# Patient Record
Sex: FEMALE | Race: WHITE
[De-identification: names, ages, dates, MRNs, and addresses within clinical notes are randomized per-mention and may not be internally consistent; named-entity substitution may affect disease eponyms.]

---

## 2017-03-02 ENCOUNTER — HOSPITAL ENCOUNTER (OUTPATIENT)
Dept: HOSPITAL 80 - FSGY | Age: 62
Discharge: HOME | End: 2017-03-02
Attending: SPECIALIST
Payer: COMMERCIAL

## 2017-03-02 DIAGNOSIS — G56.02: Primary | ICD-10-CM

## 2017-03-02 DIAGNOSIS — K21.9: ICD-10-CM

## 2017-03-02 PROCEDURE — 01N50ZZ RELEASE MEDIAN NERVE, OPEN APPROACH: ICD-10-PCS | Performed by: SPECIALIST

## 2017-03-02 NOTE — GOP
DATE OF OPERATION:  03/02/2017



SURGEON:  Juan Lombardi MD



PREOPERATIVE DIAGNOSIS:  Left carpal tunnel syndrome.



POSTOPERATIVE DIAGNOSIS:  Left carpal tunnel syndrome.



PROPOSED OPERATION:  Open left carpal tunnel release. 



FINDINGS:  



INDICATIONS:  Very significant symptoms of numbness, tingling in the median 
distribution left hand.  Some weakness and decreased coordination.  EMG and 
nerve conduction study showed severe carpal tunnel syndrome on the left side.  
It was felt that surgical release at this point was a good option for her.



DESCRIPTION OF PROCEDURE:  Under local infiltration block with 0.5% plain 
Marcaine and with monitored anesthesia care and sedation by anesthesiology, the 
patient's left arm was prepped and draped in the usual fashion.  Arm tourniquet 
applied at 250 mmHg.  Longitudinal incision was made from the mid palm to the 
heel of the palm.  Skin and subcutaneous tissue was reflected and one sensory 
branch of the palmar cutaneous nerve was identified and protected.  Palmar 
fascia and transverse carpal ligament were released longitudinally and distal 
forearm fascia was released by passing a Metzenbaum scissors under the skin at 
the distal forearm after first bringing up overlying and underlying structures.
  The median nerve was erythematous and hour glassed under the transverse 
carpal ligament.  The ligament was notably thickened.  Once the release had 
been performed, distal branches were inspected.  There was no entrapment there, 
and proximally in the forearm there was no evidence of pathology.  Wound was 
irrigated with 0.5% plain Marcaine.  Skin closed with horizontal mattress 
sutures of 5-0 Prolene.  A bulky soft pressure dressing was applied followed by 
power base fiberglass splint, held in place with an Ace bandage.  She tolerated 
the procedure well.  There were no complications.  Tourniquet deflation 
resulted in immediate pinking of the digits.  She was brought to the recovery 
area where detailed postoperative instructions were given prior to discharge.  
A prescription for Percocet and Keflex was provided.  She takes Percocet on a 
regular basis and this was some additional Percocet for increased pain from 
postoperative symptoms.  Followup arrangements in the office for about a week 
postop for dressing and suture removal and remobilization exercises with a 
Velcro strap splint to be worn at night for a month and as need be during the 
day.





Job #:  [id number]

MTDD

## 2017-05-25 ENCOUNTER — HOSPITAL ENCOUNTER (OUTPATIENT)
Dept: HOSPITAL 80 - FIMAGING | Age: 62
End: 2017-05-25
Attending: INTERNAL MEDICINE
Payer: COMMERCIAL

## 2017-05-25 DIAGNOSIS — D25.1: Primary | ICD-10-CM

## 2017-05-25 DIAGNOSIS — D25.2: ICD-10-CM

## 2017-05-25 DIAGNOSIS — N95.0: ICD-10-CM

## 2017-06-12 ENCOUNTER — HOSPITAL ENCOUNTER (OUTPATIENT)
Dept: HOSPITAL 80 - FIMAGING | Age: 62
End: 2017-06-12
Attending: NURSE PRACTITIONER
Payer: COMMERCIAL

## 2017-06-12 DIAGNOSIS — M54.2: ICD-10-CM

## 2017-06-12 DIAGNOSIS — Z98.1: Primary | ICD-10-CM

## 2017-06-12 DIAGNOSIS — Z09: ICD-10-CM

## 2017-06-13 ENCOUNTER — HOSPITAL ENCOUNTER (OUTPATIENT)
Dept: HOSPITAL 80 - FLAB | Age: 62
End: 2017-06-13
Attending: NURSE PRACTITIONER
Payer: COMMERCIAL

## 2017-06-13 DIAGNOSIS — M51.34: Primary | ICD-10-CM

## 2017-06-13 DIAGNOSIS — Z98.1: ICD-10-CM

## 2017-07-31 ENCOUNTER — HOSPITAL ENCOUNTER (OUTPATIENT)
Dept: HOSPITAL 80 - FIMAGING | Age: 62
End: 2017-07-31
Attending: INTERNAL MEDICINE
Payer: COMMERCIAL

## 2017-07-31 DIAGNOSIS — Z12.31: Primary | ICD-10-CM

## 2017-07-31 DIAGNOSIS — Z09: ICD-10-CM

## 2017-07-31 PROCEDURE — G0202 SCR MAMMO BI INCL CAD: HCPCS

## 2017-12-12 ENCOUNTER — HOSPITAL ENCOUNTER (OUTPATIENT)
Dept: HOSPITAL 80 - FLAB | Age: 62
End: 2017-12-12
Attending: NURSE PRACTITIONER
Payer: COMMERCIAL

## 2017-12-12 DIAGNOSIS — Z98.1: ICD-10-CM

## 2017-12-12 DIAGNOSIS — M51.34: ICD-10-CM

## 2017-12-12 DIAGNOSIS — Z09: Primary | ICD-10-CM

## 2018-01-16 ENCOUNTER — HOSPITAL ENCOUNTER (OUTPATIENT)
Dept: HOSPITAL 80 - FIMAGING | Age: 63
End: 2018-01-16
Attending: NEUROLOGICAL SURGERY
Payer: COMMERCIAL

## 2018-01-16 DIAGNOSIS — M40.12: ICD-10-CM

## 2018-01-16 DIAGNOSIS — M54.2: Primary | ICD-10-CM

## 2018-01-16 DIAGNOSIS — M43.22: ICD-10-CM

## 2018-02-14 ENCOUNTER — HOSPITAL ENCOUNTER (OUTPATIENT)
Dept: HOSPITAL 80 - FIMAGING | Age: 63
End: 2018-02-14
Attending: PHYSICIAN ASSISTANT
Payer: COMMERCIAL

## 2018-02-14 DIAGNOSIS — M48.02: Primary | ICD-10-CM

## 2018-02-14 DIAGNOSIS — M46.92: ICD-10-CM

## 2018-07-04 ENCOUNTER — HOSPITAL ENCOUNTER (EMERGENCY)
Dept: HOSPITAL 80 - CED | Age: 63
Discharge: HOME | End: 2018-07-04
Payer: COMMERCIAL

## 2018-07-04 VITALS — DIASTOLIC BLOOD PRESSURE: 94 MMHG | SYSTOLIC BLOOD PRESSURE: 175 MMHG

## 2018-07-04 DIAGNOSIS — S46.211A: Primary | ICD-10-CM
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
## 2018-07-04 NOTE — EDPHY
H & P


Time Seen by Provider: 07/04/18 18:07


HPI/ROS: 





This patient was trying out air mattresses 5 days ago and when she pushed up 

off the floor she developed abrupt onset of biceps pain that radiated up to the 

shoulder.  This was sharp in nature moderate intensity and thereafter she had 

mild to moderate pain at the biceps and right shoulder area.  3 days prior to 

arrival for getting that she had injured the arm she lifted a cooler full of 

sodas and felt abrupt worsening in a tearing feeling in the same area-right 

biceps.  Today without any recurrent injury she noted significant swelling, 

ecchymosis and increase in pain to 6 or 7/10 to the right biceps region 

radiating to the right shoulder and into the right trapezium.  At 1:00 p.m. She 

took an oxycodone which improved the pain to 5/10.   Concerned about potential 

DVT or other complication she came in for evaluation.  She arrived here by 

private vehicle with a friend.





ROS:  Neuro:  Patient has baseline paresthesias to the right arm that she 

attributes to her cervical spine disease that is baseline unchanged.  No new 

neuro symptoms knee affected upper extremity.


Musculoskeletal:  No new midline neck pain.


Pulmonary:  No complaints-no shortness of breath


5 point ROS is otherwise negative.





Past Medical/Surgical History: 





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  





GERD





Chronic neck and back pain








Social History: 





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


Smoking Status: Never smoked


Physical Exam: 





Physical Exam


Vital signs are normal.


General:  No acute distress


HEENT:  Atraumatic.  


Eyes:  Pupils equal and react to light.  Extraocular motions are intact.


Lungs:  No respiratory distress.


Cardiac:  Brisk capillary refill is intact throughout.  Pulses are 2+ and 

symmetric in the affected extremity.


Skin:  No rash or pallor.


Extremities:  Atraumatic normal except for right upper extremity


Right upper extremity:  Patient has moderate ecchymosis and focal swelling to 

the mid biceps area.  He has mild weakness to flexion of the right arm and 

increase in pain when she does so.  She also has tenderness and mild spasm to 

the right trapezius muscle.  No focal tenderness at the right shoulder 

limitation range of motion of the shoulder.


Neuro:  Alert and oriented x3 with no sensorimotor deficits in the affected 

upper extremity.





Initial differential diagnosis:  Biceps tendon tear, biceps muscle tear, post 

traumatic hematoma, posttraumatic DVT


Constitutional: 


 Initial Vital Signs











Temperature (C)  37.1 C   07/04/18 17:35


 


Heart Rate  74   07/04/18 17:35


 


Respiratory Rate  18   07/04/18 17:35


 


Blood Pressure  179/88 H  07/04/18 17:35


 


O2 Sat (%)  96   07/04/18 17:35








 











O2 Delivery Mode               Room Air














Allergies/Adverse Reactions: 


 





cefaclor [From Ceclor] Allergy (Intermediate, Verified 07/04/18 17:39)


 Hives


nalbuphine HCl [From Nubain] Allergy (Intermediate, Verified 07/04/18 17:39)


 Other-Enter Comments


promethazine HCl [From Phenergan] Allergy (Intermediate, Verified 07/04/18 17:39

)


 Anxiety








Home Medications: 














 Medication  Instructions  Recorded


 


Fluoxetine HCl [Prozac 40 mg] 40 mg PO DAILY 09/16/15


 


oxyCODONE IR [Oxycodone Ir (*)] 5 mg PO TID PRN 10/29/15


 


Estradiol [Vivelle-Dot 0.025MG (*)] 0.025 mg TD SA@08 11/21/15


 


Aygestin 1 patch 04/29/16


 


Cyanocobalamin 1 unit IM 04/29/16


 


FOLIC ACID 400 mg 02/10/17


 


GABAPENTIN 1 tab TID 02/10/17


 


Pantoprazole Sodium 1 tab DAILY 02/10/17


 


Meloxicam  07/04/18


 


Methocarbamol [Robaxin 750 mg (*)] 750 - 1,500 mg PO QID PRN #30 tab 07/04/18


 


VENLAFAXINE HCL  07/04/18














MDM/Departure





- MDM


Imaging Results: 


 Imaging Impressions





Extremity Venous Study  07/04/18 18:17


Impression:  No evidence of vein thrombosis in the right arm.


 


Results called and discussed with CHACHO SULLIVAN M.D. on 7/4/2018 at 19:48.


 


 











Imaging: Discussed imaging studies w/ On call Radiologist


Medications Given: 


 








Discontinued Medications





Acetaminophen (Tylenol)  1,000 mg PO EDNOW ONE


   Stop: 07/04/18 18:17


   Last Admin: 07/04/18 18:25 Dose:  1,000 mg


Diazepam (Valium)  5 mg PO EDNOW ONE


   Stop: 07/04/18 18:17


   Last Admin: 07/04/18 18:26 Dose:  5 mg





ED Course/Re-evaluation: 


Tylenol, Valium with pain diminish to 3 or 4/10.


Ace wrap





I counseled patient regarding ultrasound negative for DVT and counseled 

regarding biceps tendon/muscle tear.





Discussion:  This patient likely had a partial biceps tear 5 days ago that she 

completed 3 days ago while lifting another object.  Today she developed more 

swelling and pain likely from hematoma.  We ruled out DVT.  She is 

neurovascularly intact without other red flag findings.  I counseled regarding 

her injury in some detail.  We placed an Ace wrap.  Will have her follow up 

with Orthopedics and limit lifting in the meantime.  She will use ice, ibuprofen

, Tylenol and methocarbamol as needed for pain as well as her current script 

that she has for oxycodone for her chronic pain.  She understands the need to 

return emergency department should she develop any significant worsening 

despite the treatment plan.





- Depart


Disposition: Home, Routine, Self-Care


Clinical Impression: 


Tear of right biceps muscle


Qualifiers:


 Encounter type: initial encounter Qualified Code(s): S46.211A - Strain of 

muscle, fascia and tendon of other parts of biceps, right arm, initial encounter





Condition: Good


Instructions:  Tendon Rupture (ED)


Additional Instructions: 


Diagnosis:  Biceps tendon and/or muscle tear right arm





You had an ultrasound today that ruled out deep venous thrombosis.





Plan:  Ice 20 min at a time at least 3 times a day but preferably more until 

the swelling is diminished.





Ace wrap when up and about





No lifting with the right arm





Call the orthopedic physician tomorrow to arrange follow-up appointment for 

early next week.





Continue ibuprofen, methocarbamol and Tylenol for pain control.





Return for any significant worsening despite the treatment plan.


Stand Alone Forms:  Work Excuse


Prescriptions: 


Methocarbamol [Robaxin 750 mg (*)] 750 - 1,500 mg PO QID PRN #30 tab


 PRN Reason: Muscle Spasms


Referrals: 


Verna Henderson MD [Primary Care Provider] - As per Instructions


Vahid You MD [Medical Doctor] - As per Instructions

## 2018-07-17 ENCOUNTER — HOSPITAL ENCOUNTER (OUTPATIENT)
Dept: HOSPITAL 80 - FIMAGING | Age: 63
End: 2018-07-17
Attending: PHYSICIAN ASSISTANT
Payer: COMMERCIAL

## 2018-07-17 DIAGNOSIS — M25.511: ICD-10-CM

## 2018-07-17 DIAGNOSIS — M75.81: ICD-10-CM

## 2018-07-17 DIAGNOSIS — M66.821: Primary | ICD-10-CM

## 2018-09-20 ENCOUNTER — HOSPITAL ENCOUNTER (OUTPATIENT)
Dept: HOSPITAL 80 - CIMAGING | Age: 63
End: 2018-09-20
Attending: INTERNAL MEDICINE
Payer: COMMERCIAL

## 2018-09-20 DIAGNOSIS — Z12.31: Primary | ICD-10-CM

## 2018-11-23 ENCOUNTER — HOSPITAL ENCOUNTER (INPATIENT)
Dept: HOSPITAL 80 - FED | Age: 63
LOS: 7 days | Discharge: NURSING, REHAB OR LONG-TERM CARE | DRG: 454 | End: 2018-11-30
Attending: INTERNAL MEDICINE | Admitting: INTERNAL MEDICINE
Payer: COMMERCIAL

## 2018-11-23 DIAGNOSIS — Z98.1: ICD-10-CM

## 2018-11-23 DIAGNOSIS — M50.123: Primary | ICD-10-CM

## 2018-11-23 DIAGNOSIS — M50.023: ICD-10-CM

## 2018-11-23 DIAGNOSIS — F32.9: ICD-10-CM

## 2018-11-23 DIAGNOSIS — M96.0: ICD-10-CM

## 2018-11-23 DIAGNOSIS — I10: ICD-10-CM

## 2018-11-23 DIAGNOSIS — G62.9: ICD-10-CM

## 2018-11-23 DIAGNOSIS — M50.03: ICD-10-CM

## 2018-11-23 LAB — PLATELET # BLD: 250 10^3/UL (ref 150–400)

## 2018-11-23 PROCEDURE — C1713 ANCHOR/SCREW BN/BN,TIS/BN: HCPCS

## 2018-11-23 RX ADMIN — Medication SCH MG: at 21:37

## 2018-11-23 RX ADMIN — GABAPENTIN SCH MG: 300 CAPSULE ORAL at 20:27

## 2018-11-23 RX ADMIN — MENTHOL PRN PATCH: 1 SPRAY TOPICAL at 21:49

## 2018-11-23 RX ADMIN — METHOCARBAMOL SCH MG: 750 TABLET ORAL at 21:36

## 2018-11-23 RX ADMIN — HYDROMORPHONE HYDROCHLORIDE PRN MG: 4 TABLET ORAL at 20:26

## 2018-11-23 RX ADMIN — HEPARIN SODIUM SCH UNIT: 5000 INJECTION, SOLUTION INTRAVENOUS; SUBCUTANEOUS at 21:37

## 2018-11-23 RX ADMIN — OXYCODONE HYDROCHLORIDE PRN MG: 15 TABLET ORAL at 21:30

## 2018-11-23 RX ADMIN — ESTRADIOL SCH MG: 0.05 PATCH, EXTENDED RELEASE TRANSDERMAL at 21:41

## 2018-11-23 NOTE — PDGENHP
History and Physical


History and Physical: 





CC: radicular neck pain





HISTORY:  This patient comes into the ER today with right arm radicular pain 

including neuropathic pains that are acute over the last 3-4 days and worsening 

significantly today.  She has a long history of spine disease with 2 prior 

lumbar surgeries and effusion in the upper cervical spine.  She has had ongoing 

neck pain and has been following with Dr. Pablo Mariano her surgeon who has told 

her she will likely need future fusion surgery in the lower cervical spine.  

Notably just recently she had a facet joint injection on the left cervical 

spine for some localized pain there.  She also tells me that 2 weeks ago she 

tripped and fell landing on her left ribcage and has some mildly sore ribs 

there.  6 days ago she tripped again on an uneven curb and landed on her 

outstretched hands in front of her, wrenching her neck a bit.  She has had some 

tight muscles in the upper thoracic spine area since then.  And now the onset 

of radicular pain comes from the lower neck area across the upper trapezius and 

lateral shoulder down the back of her arm into her mid hand area.  She has 

burning tingling and itching sensations involved in this pain.  If she turns 

her head to the right and looks down the pain is exacerbated.  She has noticed 

no weakness and no actual numb areas.  There is no fever or fever like 

symptoms.  She has no trouble with bowel or bladder function, is eating well.








ROS:  She recently had a cough that has resolved completely with no dyspnea or 

fevers.  A comprehensive 10 system review revealed no other significant findings








PAST MEDICAL HISTORY:


NonFlow Limiting CAD by angiography in 2012


OA


B12 defic


anxiety disorder


depression


HTN


peripheral neuropathy


Cervical Spine disease





FAMILY MEDICAL HISTORY:


CAD


HTN


Hyperlipid


Throat Cancer


DM1








SOCIAL HISTORY:


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


No tobacco, little Etoh





MEDICATIONS:


The patients list has been reconciled by our clinical pharmacist in the EMR.  I 

have reviewed the list and ordered appropriate medicines.





PHYSICAL EXAMINATION:


Vital Signs: some HTN otherwise stable


Cardiac Monitor:





Examination:


General: alert, oriented, good mentation, relaxed


Skin: warm, dry, good color, no rash


HEENT: normal


Neck: no mass or jvd


Resps: relaxed


Lungs: clear breath sounds


Heart: regular, no murmur


Abdomen: soft, nondistended, nontender, +BS, no mass


Upper Extremities: normal


Lower Extremities: no edema, warm


No Bleeding or bruising


Neurologic: normal speech/language, normal CNs, no focal weakness


IV site: looks normal








LABORATORY DATA:


unremarkable cbc and metabolic panel





RADIOLOGY STUDIES:


I reviewed images of MRI cspine done tonight in ER, shows new severe disc 

disease with C6-7 severe canal stenosis and foramenal stenosis





12 LEAD EKG:





ASSESSMENT:


* Severe acute radicular Cervical pain with new disc disease on MRI consistent 

with the anatomy of her pain


* Multiple prior spine surgeries including an upper cervical fusion


* Currently no loss of sensation or strength


* Hypertension on admission to the ER is likely due to pain and anxiety, it is 

improving spontaneously and will need close follow-up here





It may be that she is having muscle spasm related to her recent falls that is 

contributing to some postural changes of the spine increasing nerve entrapment 

and symptoms.  However she does have significant disc disease.  She is hoping 

to avoid surgery.  Will plan on starting with some oral steroids, muscle 

relaxers, and analgesics along with anti-inflammatories and see how much 

progress he makes.  Could consider an epidural steroid injection.  If she does 

not progress she can always consider a surgery but there is no urgent 

indication for surgery at this moment.  Dr. Walker from the neurosurgery service 

has been notified of the patient's admission








PLANS:


* Inpatient admission as I expect it will be more than 48 hr before we get 

adequate pain control in this patient


* Oral steroids have been started in the ER, will use nonsteroidals, muscle 

relaxers, analgesics as well


* Consider epidural steroid injection of the next couple of days if she does 

not have significant improvement


* Neurosurgery consult is requested by the ER staff


* Continue her other usual medications


* Follow blood pressures closely and consider additional treatment if necessary








I have reviewed the patient's case in detail with Dr. Enrique Almendarez


I have reviewed the patient's past medical records as part of this assessment, 

including prior hospital admission records

## 2018-11-23 NOTE — GCON
DATE OF CONSULTATION:  11/23/2018



Patient was seen and evaluated at approximately 6:30 p.m. in the ER at [facility].



HPI:  The patient is a 63-year-old woman who was a patient of my partner, Dr. Mariano.  In 2014, he di
d a C4 corpectomy, C5-6 ACDF, and C4-C6 anterior spinal fusion.  Since that time, she has done fairly
 well, but has had continued neck pain.  Most recently in January, she had a CT scan, which shows a p
seudoarthrosis at C5-6 with some loosening of the C6 screws.  She also has a bit of spondylolisthesis
 at the adjacent level of C6-7.  She notes that she has had numbness and tingling in the right arm in
 a C8 distribution for about the past 3 months.  She was visiting her family in [place] and had a fall 
earlier this week and now has had severe pain in the neck, trapezius, and radiating down the lateral 
aspect of the right arm to the dorsal forearm and to the 4th and 5th digits.  This burning pain has b
een severe enough that it caused her to come to the emergency department today.  She did not have any
 notable neurologic deficits, although she states that she has been dropping things for the past reinier
ral months with both hands and has had difficulty with her balance, has been tripping over things.  A
 new MRI was done here, which again shows spondylolisthesis at C6-7 with severe adjacent segment dise
ase and severe canal stenosis with bilateral foraminal stenosis.



REVIEW OF SYSTEMS:  A 10-point review of systems is negative other than that described above in the H
PI.



PAST MEDICAL HISTORY:  

1.  Coronary artery disease by angiography in 2012.

2.  Osteoarthritis.

3.  Vitamin B12 deficiency.

4.  Anxiety disorder.

5.  Depression.

6.  Hypertension.

7.  Peripheral neuropathy.

8.  Previous cervical spine fusion.



FAMILY HISTORY:  The family history is positive for:

1.  Coronary artery disease.

2.  Hypertension.

3.  Hyperlipidemia.

4.  Throat cancer.

5.  Diabetes.



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  She denies any tobacco or illicit drug use.  
She uses very little social alcohol.



ALLERGIES:  

1.  Ceclor.

2.  Nalbuphine.

3.  Promethazine.



HOME MEDICATIONS:  

1.  Fluoxetine.

2.  Oxycodone.

3.  Estradiol.

4.  Aygestin.

5.  Vitamin B12.

6.  Gabapentin.

7.  Pantoprazole.

8.  Meloxicam.

9.  Robaxin.

10.  Venlafaxine.

11.  Prednisone.



PHYSICAL EXAM:  VITAL SIGNS:  Currently she is afebrile with normal stable vital signs.  GENERAL:  Ken burnham is awake, alert, and oriented x3.  NEURO:  Cranial nerves 2-12 are grossly normal.  MUSCULOSKELETAL
:  She has 5/5 motor strength of the deltoid, biceps, triceps, wrist flexion and extension,  bila
terally.  In the lower extremities, she has 5/5 strength at the hip flexors, extensors, knee flexors,
 extensors, and plantar and dorsiflexion.  Her sensation is grossly normal to light touch throughout 
the arms and legs.  She has brisk reflexes at the elbows and a positive Turner's in both hands.  In 
the lower extremities, her reflexes at the knees are mute as she has had bilateral knee surgery, but 
she does have sustained clonus on the left foot and about 7 beats of clonus on the right foot.  The B
abinski is rather mute, but her toes may be slightly upgoing.



IMAGING REVIEW:  See HPI.



ASSESSMENT/PLAN:  The patient is a 63-year-old woman with a prior C4 corpectomy, C5-6 ACDF, and C3-C6
 anterior spinal fusion.  She presents now with worsening of her known severe adjacent segment diseas
e at C6-7 with severe canal and bilateral foraminal stenosis at C6-7.  By examination, she is myelopa
thic with severe right arm radiculopathy.  I have reviewed all of her films and by her old CT from Ja
nuary of this year, she has what appears to be a relatively stable pseudoarthrosis at C5-6 with loose
bienvenido of the screws at C6 as well.  The remaining portion of her fusion appears to be quite stable, al
though it does appear there may have been some pistoning of the corpectomy graft into the C5 vertebra
l body.  I discussed with her the options, but I really do not see many other options than surgical c
orrection of this problem.  Unfortunately, this would need to involve a C6-7 anterior cervical diskec
jessica and fusion followed by a posterior spinal fusion from C3-T1.  I explained to her what all the rosales
rgery would entail and it is obviously a larger procedure than she has had previously.  It would be i
mportant to lock her together in the back given that she already has a pseudoarthrosis at C5-6 and he
r likely poor bone quality.  I do not think that this is an emergent operation, but given the signs o
f myelopathy, I would do it sooner rather than later.  I did offer to schedule this for her tomorrow 
morning.  However, she really would prefer to have Dr. Mariano do this if possible as he was her prior
 surgeon.  I will discuss this with Dr. Mariano and see what his availability might be to do her surge
ry for her and if he does not have availability, then I would be more than happy to reschedule her at
 another time.  For now, she is admitted to the Internal Medicine service and they will pursue pain c
ontrol for her.  We will follow along closely and keep the primary service abreast of any surgical de
velopments.  Thanks for the kind consultation.





Job #:  [id number]

## 2018-11-23 NOTE — EDPHY
H & P


Time Seen by Provider: 11/23/18 13:30


HPI/ROS: 





CHIEF COMPLAINT:  Neck pain





HISTORY OF PRESENT ILLNESS:  Patient is a 63-year-old female who presents to 

the emergency department with neck pain radiating down her right arm.  The 

patient states she fell Saturday, landing forward on her knees and hands.  She 

felt a slight jar in her low back with the fall.  She denied any neck pain.  

She did not strike her head or lose consciousness.  She subsequently developed 

right lateral neck pain extending down her right arm.  She describes it as a 

burning sensation.  Patient has previously had a C3 through C6 fusion.  She has 

seen Dr. Mariano for ongoing right arm tingling.  He states that disc space 

below the fusion is having some compression.  The patient has no low back pain.

  No incontinence of urine or stool.





REVIEW OF SYSTEMS:  


10 systems were reveiwed and are negative with the exception of the elements 

mentioned in the history of present illness.


Past Medical/Surgical History: 





Includes back pain, neck pain, hiatal hernia, H pylori





Past surgical history:  Cholecystectomy, orthopedic surgery, cervical fusion, 

thoracic fusion


Smoking Status: Never smoked


Physical Exam: 





Vitals noted


GENERAL:  Well-appearing, in no acute distress, alert.


HEENT:  Eyes normal to inspection, normal pharynx, no signs of dehydration.


NECK:  Normal, supple.  No tenderness to palpation.  No bruit.


RESPIRATORY:  Clear to auscultation bilaterally, no rales, rhonchi or wheezing.


CVS:  Regular rate and rhythm, no rubs, murmurs, or gallops.


ABDOMEN:  Soft, nontender, nondistended, no organomegaly.


BACK:  Normal to inspection, no CVA tenderness.


SKIN:  Normal color, no rash, warm, dry.  No pallor.


EXTREMITIES:  No pedal edema, no calf tenderness, no Homans sign or cords, no 

joint swelling.


NEURO/PSYCH:  Alert and oriented, normal mood and affect, normal motor sensory 

exam.  No obvious cranial nerve deficit.


Constitutional: 


 Initial Vital Signs











Temperature (C)  37.1 C   11/23/18 13:30


 


Heart Rate  101 H  11/23/18 13:30


 


Respiratory Rate  18   11/23/18 13:30


 


Blood Pressure  187/87 H  11/23/18 13:30


 


O2 Sat (%)  95   11/23/18 13:30








 











O2 Delivery Mode               Room Air














Allergies/Adverse Reactions: 


 





cefaclor [From [facility]] Allergy (Intermediate, Verified 07/04/18 17:39)


 Hives


nalbuphine HCl [From Nubain] Allergy (Intermediate, Verified 07/04/18 17:39)


 Other-Enter Comments


promethazine HCl [From Phenergan] Allergy (Intermediate, Verified 07/04/18 17:39

)


 Anxiety








Home Medications: 














 Medication  Instructions  Recorded


 


Fluoxetine HCl [Prozac 40 mg] 40 mg PO DAILY 09/16/15


 


oxyCODONE IR [Oxycodone Ir (*)] 5 mg PO TID PRN 10/29/15


 


Estradiol [Vivelle-Dot 0.025MG (*)] 0.025 mg TD SA@08 11/21/15


 


Aygestin 1 patch 04/29/16


 


Cyanocobalamin 1 unit IM 04/29/16


 


GABAPENTIN 1 tab TID 02/10/17


 


Pantoprazole Sodium 1 tab DAILY 02/10/17


 


Meloxicam  07/04/18


 


Methocarbamol [Robaxin 750 mg (*)] 750 - 1,500 mg PO QID PRN #30 tab 07/04/18


 


VENLAFAXINE HCL  07/04/18


 


oxyCODONE/APAP 5/325 [Percocet 1 - 2 tab PO Q4PRN PRN #11 tab 11/23/18





5/325 (*)]  


 


predniSONE 20 mg PO DAILY 4 Days  tab 11/23/18














Medical Decision Making


ED Course/Re-evaluation: 





In the emergency department I discussed possible etiologies with the patient.  

I answered all her questions.  Patient has ongoing neck pain radiating down her 

right arm.  MRI of her cervical spine was ordered.  Patient was given Toradol 

30 mg IV for pain, fentanyl 25 mcg IV for pain and Solu-Medrol 125 mg IV for 

pain.





1445:  The patient is complaining of ongoing right lateral neck and right arm 

pain.  The patient was given fentanyl 100 mcg IV.





1500:  The patient is signed out to Dr. Carbajal at change of shift.  The 

patient is awaiting MRI results.  


Differential Diagnosis: 





My differential includes but is not limited to disc herniation, cervical strain

, mass, malignancy, hematoma, CVA, dissection





- Data Points


Medications Given: 


 








Discontinued Medications





Fentanyl (Sublimaze)  25 mcg IVP EDNOW ONE


   Stop: 11/23/18 14:10


   Last Admin: 11/23/18 14:26 Dose:  25 mcg


Sodium Chloride (Ns)  1,000 mls @ 3,000 mls/hr IV ONCE ONE


   Stop: 11/23/18 14:28


   Last Admin: 11/23/18 14:14 Dose:  1,000 mls


Ketorolac Tromethamine (Toradol)  30 mg IVP EDNOW ONE


   Stop: 11/23/18 14:11


   Last Admin: 11/23/18 14:20 Dose:  30 mg


Methylprednisolone Sodium Succinate (Solu-Medrol)  125 mg IVP EDNOW ONE


   Stop: 11/23/18 14:11


   Last Admin: 11/23/18 14:26 Dose:  125 mg


Ondansetron HCl (Zofran)  4 mg IVP EDNOW ONE


   Stop: 11/23/18 14:10


   Last Admin: 11/23/18 14:27 Dose:  4 mg








Departure





- Departure


Disposition: Home, Routine, Self-Care


Clinical Impression: 


 Neck pain





Condition: Good


Instructions:  Acute Neck Pain (ED)


Additional Instructions: 


Return with increasing pain, weakness, numbness or any other concerns.  Take 

your entire course of steroid.


Referrals: 


Verna Henderson MD [Primary Care Provider] - 5-7 days, call for appt.


STEPHANIA Mariano MD [Medical Doctor] - 5-7 days, call for appt.


Prescriptions: 


oxyCODONE/APAP 5/325 [Percocet 5/325 (*)] 1 - 2 tab PO Q4PRN PRN #11 tab


 PRN Reason: For Moderate To Severe Pain


predniSONE 20 mg PO DAILY 4 Days  tab

## 2018-11-24 RX ADMIN — OXYCODONE HYDROCHLORIDE PRN MG: 15 TABLET ORAL at 14:37

## 2018-11-24 RX ADMIN — HEPARIN SODIUM SCH UNIT: 5000 INJECTION, SOLUTION INTRAVENOUS; SUBCUTANEOUS at 14:37

## 2018-11-24 RX ADMIN — ZOLPIDEM TARTRATE PRN MG: 5 TABLET ORAL at 00:36

## 2018-11-24 RX ADMIN — VENLAFAXINE HYDROCHLORIDE SCH MG: 150 CAPSULE, EXTENDED RELEASE ORAL at 08:32

## 2018-11-24 RX ADMIN — Medication SCH MG: at 20:39

## 2018-11-24 RX ADMIN — ZOLPIDEM TARTRATE PRN MG: 5 TABLET ORAL at 22:26

## 2018-11-24 RX ADMIN — MENTHOL PRN PATCH: 1 SPRAY TOPICAL at 08:34

## 2018-11-24 RX ADMIN — HEPARIN SODIUM SCH UNIT: 5000 INJECTION, SOLUTION INTRAVENOUS; SUBCUTANEOUS at 22:22

## 2018-11-24 RX ADMIN — POLYETHYLENE GLYCOL 3350 PRN GM: 17 POWDER, FOR SOLUTION ORAL at 23:49

## 2018-11-24 RX ADMIN — METHOCARBAMOL SCH MG: 750 TABLET ORAL at 14:45

## 2018-11-24 RX ADMIN — PANTOPRAZOLE SODIUM SCH MG: 40 TABLET, DELAYED RELEASE ORAL at 08:32

## 2018-11-24 RX ADMIN — GABAPENTIN SCH MG: 300 CAPSULE ORAL at 08:32

## 2018-11-24 RX ADMIN — OXYCODONE HYDROCHLORIDE PRN MG: 15 TABLET ORAL at 08:48

## 2018-11-24 RX ADMIN — Medication SCH UNITS: at 08:32

## 2018-11-24 RX ADMIN — METHOCARBAMOL SCH MG: 750 TABLET ORAL at 22:25

## 2018-11-24 RX ADMIN — OXYCODONE HYDROCHLORIDE PRN MG: 15 TABLET ORAL at 20:38

## 2018-11-24 RX ADMIN — METHOCARBAMOL SCH MG: 750 TABLET ORAL at 08:33

## 2018-11-24 RX ADMIN — HYDROMORPHONE HYDROCHLORIDE PRN MG: 4 TABLET ORAL at 00:36

## 2018-11-24 RX ADMIN — HEPARIN SODIUM SCH UNIT: 5000 INJECTION, SOLUTION INTRAVENOUS; SUBCUTANEOUS at 05:35

## 2018-11-24 RX ADMIN — GABAPENTIN SCH MG: 300 CAPSULE ORAL at 22:25

## 2018-11-24 RX ADMIN — NORETHINDRONE ACETATE SCH MG: 5 TABLET ORAL at 20:36

## 2018-11-24 RX ADMIN — GABAPENTIN SCH MG: 300 CAPSULE ORAL at 14:45

## 2018-11-24 NOTE — PDMN
Medical Necessity


Medical necessity: Pt meets inpt criteria per MD order and [facility] [place]. 

62 y/o w/hx spine disease w/prior spine surgeries presented w/severe pain in 

neck, trapezius, and radiating down R arm to hand, hypertension and 

tachycardia. MRI shows spondylolisthesis at C6-7 w/severe adjacent segment 

disease and severe canal stenosis w/bilat foraminal stenosis. Neurosurgery 

consult, surgery likely needed, pain management, anticipate>2MN for ongoing eval

/management of above.

## 2018-11-24 NOTE — NEUSURGPN
Assessment/Plan: 





64y/o female with cervical stenosis,pseudoarthrosis  myelopathy and right arm 

pain





- Optimize pain management


-Discussed with patient C6/7 ACDF with posterior C3-T1 fusion. She is a known 

patient of Dr. Mariano and would like to await his review. 


-Continue Q4 hour neuro checks


-Please notify NS with any change in neuro/motor exam


-Discussed with Dr. Persaud


Subjective: 


right arm pain, neck pain 


Objective: 


NAD A&Ox3 MAEx4 5/5 and equal in BUE and BLE. Positive bilateral hoffmans and 2-

3beat clonus





- Physician


Discussed Patient with Dr.: Persaud





Neurosurgery Physical Exam





- Vitals, I&O, Labs





 I and O











 11/23/18 11/24/18 11/25/18





 05:59 05:59 05:59


 


Intake Total  2500 


 


Output Total  802 


 


Balance  1698 


 


Weight  83.915 kg 


 


Intake:   


 


  Oral (ml)  500 


 


  IV Infused (ml)  2000 


 


Output:   


 


  Urine (ml)  802 


 


    Toilet  800 


 


Other:   


 


  Intake Quantity  Yes 





  Sufficient   








 Vital Signs











Temp Pulse Resp BP Pulse Ox


 


 36.8 C   102 H  17   168/87 H  95 


 


 11/24/18 07:16  11/24/18 07:16  11/24/18 07:16  11/24/18 07:16  11/24/18 08:30














ICD10 Worksheet


Patient Problems: 


 Problems











Problem Status Onset


 


Neck pain Acute  


 


Arthrodesis status Acute  


 


Cervical radiculitis Acute  


 


Cervical stenosis of spine Acute  


 


H/O cervical spinal arthrodesis Acute  


 


Low back pain Acute  


 


Lumbosacral stenosis Acute

## 2018-11-24 NOTE — ASMTCMCOM
CM Note

 

CM Note                       

Notes:

Chart reviewed by FILI. Pt lives with life partner Simona in [place], and has a history of multiple spine 


surgeries. Pt to have cervical fusion and discectomy in a few days. PT and OT to evaluated post 

op. Discharge plan TBD. 



D/C Plan: TBD pending surgery and therapy eval

 

Date Signed:  11/24/2018 09:22 AM

Electronically Signed By:Silva Cobb

## 2018-11-25 LAB
INR PPP: 1.01 (ref 0.83–1.16)
PROTHROMBIN TIME: 13.5 SEC (ref 12–15)

## 2018-11-25 RX ADMIN — DOCUSATE SODIUM AND SENNOSIDES SCH TAB: 50; 8.6 TABLET ORAL at 21:53

## 2018-11-25 RX ADMIN — PANTOPRAZOLE SODIUM SCH MG: 40 TABLET, DELAYED RELEASE ORAL at 09:35

## 2018-11-25 RX ADMIN — ZOLPIDEM TARTRATE PRN MG: 5 TABLET ORAL at 21:54

## 2018-11-25 RX ADMIN — GABAPENTIN SCH MG: 300 CAPSULE ORAL at 09:36

## 2018-11-25 RX ADMIN — METHOCARBAMOL SCH MG: 750 TABLET ORAL at 15:11

## 2018-11-25 RX ADMIN — DOCUSATE SODIUM AND SENNOSIDES SCH TAB: 50; 8.6 TABLET ORAL at 09:36

## 2018-11-25 RX ADMIN — HEPARIN SODIUM SCH UNIT: 5000 INJECTION, SOLUTION INTRAVENOUS; SUBCUTANEOUS at 06:26

## 2018-11-25 RX ADMIN — Medication SCH UNITS: at 09:35

## 2018-11-25 RX ADMIN — METHOCARBAMOL SCH MG: 750 TABLET ORAL at 21:58

## 2018-11-25 RX ADMIN — METHOCARBAMOL SCH MG: 750 TABLET ORAL at 09:36

## 2018-11-25 RX ADMIN — NORETHINDRONE ACETATE SCH MG: 5 TABLET ORAL at 21:52

## 2018-11-25 RX ADMIN — OXYCODONE HYDROCHLORIDE PRN MG: 15 TABLET ORAL at 15:12

## 2018-11-25 RX ADMIN — GABAPENTIN SCH MG: 300 CAPSULE ORAL at 15:11

## 2018-11-25 RX ADMIN — OXYCODONE HYDROCHLORIDE PRN MG: 15 TABLET ORAL at 09:35

## 2018-11-25 RX ADMIN — VENLAFAXINE HYDROCHLORIDE SCH MG: 150 CAPSULE, EXTENDED RELEASE ORAL at 09:36

## 2018-11-25 RX ADMIN — Medication SCH MG: at 21:55

## 2018-11-25 RX ADMIN — GABAPENTIN SCH MG: 300 CAPSULE ORAL at 21:54

## 2018-11-25 NOTE — NEUSURGPN
Assessment/Plan: 





62y/o female with cervical stenosis,pseudoarthrosis  myelopathy and right arm 

pain





- Optimize pain management


-Surgical plan: C6/7 ACDF with posterior C3-T1 fusion. She is a known patient 

of Dr. Mariano and would like to await his review. 


-NPO after midnight. Will order presurgery medications. 


-HOLD any Lovenox or anticoagulation


-Continue Q4 hour neuro checks


-Please notify NS with any change in neuro/motor exam


-Discussed with Dr. Persaud


Subjective: 





right arm pain, neck pain 





Objective: 


NAD A&Ox3 MAEx4 5/5 and equal in BUE and BLE. 





- Physician


Discussed Patient with Dr.: Persaud





Neurosurgery Physical Exam





- Vitals, I&O, Labs





 I and O











 11/24/18 11/25/18 11/26/18





 05:59 05:59 05:59


 


Intake Total 2500 1700 


 


Output Total 802  


 


Balance 1698 1700 


 


Weight 83.915 kg  


 


Intake:   


 


  Oral (ml) 500 1700 


 


  IV Infused (ml) 2000  


 


Output:   


 


  Urine (ml) 802  


 


    Toilet 800  


 


Other:   


 


  Intake Quantity Yes  





  Sufficient   


 


  Number of Voids   


 


    Toilet  2 








 Vital Signs











Temp Pulse Resp BP Pulse Ox


 


 36.8 C   95   14   151/90 H  95 


 


 11/25/18 08:00  11/25/18 08:00  11/25/18 08:00  11/25/18 08:00  11/25/18 08:00














ICD10 Worksheet


Patient Problems: 


 Problems











Problem Status Onset


 


Neck pain Acute  


 


Arthrodesis status Acute  


 


Cervical radiculitis Acute  


 


Cervical stenosis of spine Acute  


 


H/O cervical spinal arthrodesis Acute  


 


Low back pain Acute  


 


Lumbosacral stenosis Acute

## 2018-11-25 NOTE — HOSPPROG
Hospitalist Progress Note


Assessment/Plan: 





The patient is a 7-year-old female with PMH DJD C-spine, C-spine surgery who 

was admitted C spine DJD.





ASSESSMENT/PLAN:





Severe C spine DJD with radiculopathy


   -possible cord compression 2/2 disc bulge (C6-7)


   -severe neural forminal radiculopathy, multilevel, R > L


   -anterolisthesis C6-7


Severe pain, 2/2 above


RUE weakness/numbness





-Continue steroid to help w/ cord swelling/pain. prn analgesics. 


-NSG recs appreciated - considering surgery early next week. Dr. Persaud offered 

to do surgery but pt wants Dr. Mariano to do it bc he has done surgery on her in 

the past.  They are planning for Monday afternoon surgery. I have case d/w NSG. 


-ISU.


-Activity as tolerated. 


-PT/OT after surgery.








VTE prophylaxis:  putting Heparin on hold per NSG. Add SCDs while in bed. 


Code Status:  Full code





Status:  Inpatient for greater than 2 midnight stay.


Disposition:  Med surge with discharge sometime after her surgery next week


____





SUBJECTIVE: Today pt feels okay. Awaiting surgery.





OBJECTIVE:





Physical Exam:


General: The patient is a female who is alert and in no acute distress. 


HEENT: normocephalic, extraocular movements intact, conjunctivae clear. Mucous 

membranes moist.


Neck: trachea midline, no visible masses. 


Abd: soft and nondistended. 


Musculoskeletal:  Normal muscle tone/bulk.


Neuro: cranial nerves II  XII grossly intact. 


Psych:  Appropriate mood and appropriate affect. 


Skin:  No pallor.  No petechiae.





Labs/Imaging/Other Tests: 





MRI C spine - 


C6-7 herniated disc w/ cervical canal stenosis, severe - possible cord 

compression


C6-7 radiculopathy, severe -bilateral


C6-7 slight increased anterolisthesis


C4-5 severe R forminal stenosis. 


C5-6 severe R forminal stenosis. 


Objective: 


 Vital Signs











Temp Pulse Resp BP Pulse Ox


 


 36.8 C   95   14   151/90 H  95 


 


 11/25/18 08:00  11/25/18 08:00  11/25/18 08:00  11/25/18 08:00  11/25/18 08:00








 











 11/24/18 11/25/18 11/26/18





 05:59 05:59 05:59


 


Intake Total 2500 1700 


 


Output Total 802  


 


Balance 1698 1700 








 











PT  13.5 SEC (12.0-15.0)   11/25/18  10:28    


 


INR  1.01  (0.83-1.16)   11/25/18  10:28    














ICD10 Worksheet


Patient Problems: 


 Problems











Problem Status Onset


 


Neck pain Acute  


 


Arthrodesis status Acute  


 


Cervical radiculitis Acute  


 


Cervical stenosis of spine Acute  


 


H/O cervical spinal arthrodesis Acute  


 


Low back pain Acute  


 


Lumbosacral stenosis Acute

## 2018-11-26 RX ADMIN — CYANOCOBALAMIN SCH: 1000 INJECTION, SOLUTION INTRAMUSCULAR at 11:41

## 2018-11-26 RX ADMIN — Medication PRN MCG: at 22:43

## 2018-11-26 RX ADMIN — HYDROMORPHONE HYDROCHLORIDE PRN MG: 2 INJECTION INTRAMUSCULAR; INTRAVENOUS; SUBCUTANEOUS at 22:41

## 2018-11-26 RX ADMIN — VENLAFAXINE HYDROCHLORIDE SCH MG: 150 CAPSULE, EXTENDED RELEASE ORAL at 08:35

## 2018-11-26 RX ADMIN — DOCUSATE SODIUM AND SENNOSIDES SCH: 50; 8.6 TABLET ORAL at 23:48

## 2018-11-26 RX ADMIN — HYDROMORPHONE HYDROCHLORIDE PRN MG: 2 INJECTION INTRAMUSCULAR; INTRAVENOUS; SUBCUTANEOUS at 22:28

## 2018-11-26 RX ADMIN — HYDROMORPHONE HCL-SODIUM CHLORIDE 0.9% INJ 6 MG/30ML PRN MG: 0.2 SOLUTION at 23:18

## 2018-11-26 RX ADMIN — GABAPENTIN SCH MG: 300 CAPSULE ORAL at 08:34

## 2018-11-26 RX ADMIN — Medication PRN MCG: at 22:48

## 2018-11-26 RX ADMIN — SODIUM CHLORIDE SCH MLS: 900 INJECTION, SOLUTION INTRAVENOUS at 23:18

## 2018-11-26 RX ADMIN — HYDROMORPHONE HYDROCHLORIDE PRN MG: 2 INJECTION INTRAMUSCULAR; INTRAVENOUS; SUBCUTANEOUS at 22:49

## 2018-11-26 RX ADMIN — Medication SCH: at 23:43

## 2018-11-26 RX ADMIN — METHOCARBAMOL SCH: 750 TABLET ORAL at 23:26

## 2018-11-26 RX ADMIN — Medication PRN MCG: at 22:53

## 2018-11-26 RX ADMIN — FAMOTIDINE SCH: 20 TABLET, FILM COATED ORAL at 23:28

## 2018-11-26 RX ADMIN — OXYCODONE HYDROCHLORIDE PRN MG: 15 TABLET ORAL at 08:35

## 2018-11-26 RX ADMIN — METHOCARBAMOL SCH: 750 TABLET ORAL at 23:43

## 2018-11-26 RX ADMIN — METHOCARBAMOL SCH MG: 750 TABLET ORAL at 08:34

## 2018-11-26 RX ADMIN — NORETHINDRONE ACETATE SCH MG: 5 TABLET ORAL at 23:45

## 2018-11-26 RX ADMIN — HYDROMORPHONE HYDROCHLORIDE PRN MG: 2 INJECTION INTRAMUSCULAR; INTRAVENOUS; SUBCUTANEOUS at 22:21

## 2018-11-26 RX ADMIN — PANTOPRAZOLE SODIUM SCH MG: 40 TABLET, DELAYED RELEASE ORAL at 08:35

## 2018-11-26 RX ADMIN — DOCUSATE SODIUM AND SENNOSIDES SCH: 50; 8.6 TABLET ORAL at 08:42

## 2018-11-26 RX ADMIN — GABAPENTIN SCH: 300 CAPSULE ORAL at 23:27

## 2018-11-26 RX ADMIN — HYDROMORPHONE HYDROCHLORIDE PRN MG: 2 INJECTION INTRAMUSCULAR; INTRAVENOUS; SUBCUTANEOUS at 22:35

## 2018-11-26 RX ADMIN — Medication SCH MLS: at 23:37

## 2018-11-26 RX ADMIN — Medication SCH UNITS: at 08:35

## 2018-11-26 NOTE — PDANEPAE
ANE History of Present Illness





ACD and F C6 to 7.  Posterior C3 and 7





ANE Past Medical History





- Cardiovascular History


Hx Hypertension: No


Hx Arrhythmias: Yes


Hx Chest Pain: No


Hx Coronary Artery / Peripheral Vascular Disease: No


Hx CHF / Valvular Disease: No


Hx Palpitations: No


Cardiovascular History Comment: HX OF PVCs. Was on Bystolic, stopped after surg 

due to low blood pressure, not restarted.





- Pulmonary History


Hx COPD: No


Hx Asthma/Reactive Airway Disease: No


Hx Recent Upper Respiratory Infection: No


Hx Oxygen in Use at Home: No


Hx Sleep Apnea: Yes


Sleep Apnea Screening Result - Last Documented: Positive





- Neurologic History


Hx Cerebrovascular Accident: No


Hx Seizures: No


Hx Dementia: No


Neurologic History Comment: Numbness L hand.





- Endocrine History


Hx Diabetes: No


Hypothyroid: No


Hyperthyroid: No





- Renal History


Hx Renal Disorders: No





- Liver History


Hx Hepatic Disorders: No





- Neurological & Psychiatric Hx


Hx Neurological and Psychiatric Disorders: Yes


Neurological / Psychiatric History Comment: Anxiety-med.





- Cancer History


Hx Cancer: No





- GI History


Hx Gastrointestinal Disorders: Yes


Gastrointestinal History Comment: GERD-med.





- Other Health History


Other Health History: Diffuse arthritis.





- Chronic Pain History


Chronic Pain: Yes (CHRONIC NECK PAIN)





- Surgical History


Prior Surgeries: LUMBAR FUSION 11/2015.  CERVICAL FUSION 10/2015.  Lumbar 

fusion. L wrist fusion. Bilateral knee plastys. Lumbar laminectomies. L foot 

fusion. Neuromas both feet. 2 Csections. Bilateral carpal tunnel. Bladder 

sling. GB removed.





ANE Review of Systems


Review of systems is: negative


Review of Systems: 








- Exercise capacity


METS (RN): 3 METS





ANE Patient History





- Allergies


Allergies/Adverse Reactions: 








cefaclor [From Ceclor] Allergy (Intermediate, Verified 07/04/18 17:39)


 Hives


nalbuphine HCl [From Nubain] Allergy (Intermediate, Verified 07/04/18 17:39)


 Other-Enter Comments


promethazine HCl [From Phenergan] Allergy (Intermediate, Verified 07/04/18 17:39

)


 Anxiety








- Home Medications


Home medications: home medication list seen and reviewed


Home Medications: 








Fluoxetine HCl [Prozac 40 mg] 40 mg PO DAILY 09/16/15 [Last Taken 11/23/18]


oxyCODONE IR [Oxycodone Ir (*)] 5 - 10 mg PO TID PRN 10/29/15 [Last Taken 11/23/ 18 12:00 10mg]


Cyanocobalamin [Vitamin B12 1000MCG/ML (*)] 1,000 mcg IM Q30D 04/29/16 [Last 

Taken 10/26/18]


Gabapentin 300 - 600 mg PO TID 02/10/17 [Last Taken 11/23/18 12:00 600mg]


Pantoprazole Sodium [Protonix 40mg (*)] 40 mg PO DAILY 02/10/17 [Last Taken 11/ 23/18]


Meloxicam 15 mg PO DAILY 07/04/18 [Last Taken 11/23/18]


Venlafaxine Xr [Effexor Xr] 150 mg PO DAILY 07/04/18 [Last Taken 11/23/18]


Acetaminophen/ASA/Caffeine [Excedrin Tablet (*)] 1 each PO DAILY PRN 11/23/18 [

Last Taken Unknown]


Cholecalciferol Vit D3 [Vitamin D3 (*)] 2,000 units PO DAILY 11/23/18 [Last 

Taken Unknown]


Estradiol [Estradiol] 0.05 mg TD TUFR 11/23/18 [Last Taken 11/20/18]


Lidocaine [Lidoderm] 1 each TP DAILY PRN 11/23/18 [Last Taken 11/23/18]


Methocarbamol [Robaxin 750 mg (*)] 750 - 1,500 mg PO Q4H PRN 11/23/18 [Last 

Taken 11/23/18]


Norethindrone Acetate 2.5 mg PO HS 11/24/18 [Last Taken 11/22/18]








- NPO status


NPO Status: no food or drink >8 hours


NPO Since - Liquids (Date): 11/26/18


NPO Since - Liquids (Time): 08:00


NPO Since - Solids (Date): 11/25/18


NPO Since - Solids (Time): 18:30





- Anes Hx


Anes Hx: no prior problems





- Smoking Hx


Smoking Status: Never smoked


Marijuana use: No





- Alcohol Use


Alcohol Use: Rarely





- Family Anes Hx


Family Anes Hx: none


Family Hx Anesthesia Complications: son-vomiting.





ANE Labs/Vital Signs





- Labs


Result Diagrams: 


 11/23/18 14:20





 11/23/18 14:20





- Vital Signs


Blood Pressure: 151/96


Heart Rate: 86


Respiratory Rate: 18


O2 Sat (%): 93


Height: 172.72 cm


Weight: 83.915 kg





ANE Physical Exam





- Airway


Mallampati Score: Class 2


Mouth exam: normal dental/mouth exam





- Pulmonary


Pulmonary: no respiratory distress, no rales or rhonchi





- Cardiovascular


Cardiovascular: regular rate and rhythym, no murmur, rub, or gallop





- ASA Status


ASA Status: III





ANE Anesthesia Plan


Anesthesia Plan: general endotracheal anesthesia


Specialized Airway: video laryngoscope

## 2018-11-26 NOTE — POSTOPPROG
Post Op Note


Date of Operation: 11/26/18


Surgeon: STEPHANIA Mariano


Assistant: Lauren Jiménez NP 


Anesthesiologist: Dr Sotelo 


Anesthesia: GET(General Endotracheal)


Pre-op Diagnosis: Cervical stenosis with myleopathy 


Procedure: ACDF C6-7, Posterior Cervical fusion C3-7


Inf/Abcess present in the surg proc area at time of surgery?: No


Depth: Deep Incisional (Fascial)


EBL: 100-500


Total fluids administered: see anesthesia 


Complications: 





none 


Drains: aPul Diaz


Date of Surgery: 11/26/18


Post Op Day: 0


Assessment/Plan: 


Assessment: 62y/o female with C6-7 cervical stenosis,pseudoarthrosis C5-6, 

myelopathy and right arm pain. S/P ACDF C6-7, Posterior Cervical fusion C3-7





Plan:


-Pain management, PCA ordered if needed 


-Advance diet as tolerated 


-HOLD any Lovenox or anticoagulation until POD#3 


-PT/OT/ST


-TAHIRA to bulb suction


-Post op x-rays ordered for am 


-Wear collar at all times 


-Please notify NS with any change in neuro/motor exam








Subjective: 


waking up in PACU 


Objective: 


waking up in pacu 


DURAN X4


5/5 BUE, BLE


Dressings x2 CDI-anterior steri strips saturated from posterior portion of 

surgery 4x4 placed to reinforce


TAHIRA patent 





Appropriate Neuro Check Frequency Ordered: Yes

## 2018-11-26 NOTE — NEUSURGPN
Assessment/Plan: 


Assessment: 64y/o female with C6-7 cervical stenosis,pseudoarthrosis C5-6, 

myelopathy and right arm pain





Plan:


- Optimize pain management, patient reports right arm pain now 2/10 following 

steroids 


-Surgical plan: C6/7 ACDF with posterior C3-C7 fusion this afternoon with Dr Mariano.  Risks and benefits were discussed and consent signed. Patient marked  


-NPO


-HOLD any Lovenox or anticoagulation


-Continue Q4 hour neuro checks


-Please notify NS with any change in neuro/motor exam


-Patient seen by Dr Mariano as well 





Subjective: 


Right arm pain improved, impaired balance per patient 


Objective: 


AxO x4


MAEx4


5/5 BUE


Positive wilhelm bilaterally 





Neuro Check Frequency: per routine 


Urinary Catheter in Place: No





- Physician


Discussed Patient with : Gracía


Patient Seen by : García





Neurosurgery Physical Exam





- Vitals, I&O, Labs





 I and O











 11/25/18 11/26/18 11/27/18





 05:59 05:59 05:59


 


Intake Total 1700 1200 


 


Balance 1700 1200 


 


Intake:   


 


  Oral (ml) 1700 1200 


 


Other:   


 


  Intake Quantity  Yes 





  Sufficient   


 


  Number of Voids   


 


    Toilet 2 2 








 Vital Signs











Temp Pulse Resp BP Pulse Ox


 


 36.8 C   81   16   161/85 H  94 


 


 11/26/18 04:31  11/26/18 04:31  11/26/18 04:31  11/26/18 04:31  11/26/18 04:31














ICD10 Worksheet


Patient Problems: 


 Problems











Problem Status Onset


 


Neck pain Acute  


 


Arthrodesis status Acute  


 


Cervical radiculitis Acute  


 


Cervical stenosis of spine Acute  


 


H/O cervical spinal arthrodesis Acute  


 


Low back pain Acute  


 


Lumbosacral stenosis Acute

## 2018-11-26 NOTE — HOSPPROG
Hospitalist Progress Note


Assessment/Plan: 





The patient is a 63-year-old female with PMH DJD C-spine, C-spine surgery who 

was admitted with right arm radicular symptoms. This is my first encounter with 

the patient, chart reviewed. 





1. Severe cervical stenosis (C6-7) with RUE myelopathy and pain


   - symptoms better with steroids


   - plan for C6-7 ACDF with posterior C3-7 fusion this afternoon with Dr Mariano


   - pain control


   - PT/OT after surgery





VTE prophylaxis: SCDs in loco-operative setting


Code Status:  Full code


Dispo: Remain inpatient for surgical management


Subjective: Attempted to see patient multiple times today unsuccessfully as she 

was in pre-op and then surgery.


Objective: 


 Vital Signs











Temp Pulse Resp BP Pulse Ox


 


 36.8 C   86   18   151/96 H  93 


 


 11/26/18 11:41  11/26/18 13:03  11/26/18 13:03  11/26/18 13:03  11/26/18 13:03








 











 11/25/18 11/26/18 11/27/18





 05:59 05:59 05:59


 


Intake Total 1700 1200 


 


Balance 1700 1200 








 











PT  13.5 SEC (12.0-15.0)   11/25/18  10:28    


 


INR  1.01  (0.83-1.16)   11/25/18  10:28    














ICD10 Worksheet


Patient Problems: 


 Problems











Problem Status Onset


 


Neck pain Acute  


 


Arthrodesis status Acute  


 


Cervical radiculitis Acute  


 


Cervical stenosis of spine Acute  


 


H/O cervical spinal arthrodesis Acute  


 


Low back pain Acute  


 


Lumbosacral stenosis Acute

## 2018-11-26 NOTE — SOAPPROG
SOAP Progress Note


Assessment/Plan: 


Assessment:





Preop VF check done. B VFs mobile.  Mild glottic gap.  Would like to see pt 

back in 4-6 weeks after surgery for rescope, though sooner if has voice or 

swallowing issues sooner. 





Full dictation pending

















Plan:





11/26/18 12:32





Objective: 





 Vital Signs











Temp Pulse Resp BP Pulse Ox


 


 36.8 C   86   18   151/96 H  93 


 


 11/26/18 11:41  11/26/18 11:41  11/26/18 11:41  11/26/18 11:41  11/26/18 11:41








 











 11/25/18 11/26/18 11/27/18





 05:59 05:59 05:59


 


Intake Total 1700 1200 


 


Balance 1700 1200 








 











PT  13.5 SEC (12.0-15.0)   11/25/18  10:28    


 


INR  1.01  (0.83-1.16)   11/25/18  10:28    














ICD10 Worksheet


Patient Problems: 


 Problems











Problem Status Onset


 


Neck pain Acute  


 


Arthrodesis status Acute  


 


Cervical radiculitis Acute  


 


Cervical stenosis of spine Acute  


 


H/O cervical spinal arthrodesis Acute  


 


Low back pain Acute  


 


Lumbosacral stenosis Acute

## 2018-11-27 LAB — PLATELET # BLD: 217 10^3/UL (ref 150–400)

## 2018-11-27 RX ADMIN — Medication SCH MG: at 21:04

## 2018-11-27 RX ADMIN — OXYCODONE HYDROCHLORIDE PRN MG: 15 TABLET ORAL at 04:49

## 2018-11-27 RX ADMIN — FAMOTIDINE SCH MG: 20 TABLET, FILM COATED ORAL at 21:04

## 2018-11-27 RX ADMIN — CYANOCOBALAMIN SCH MCG: 1000 INJECTION, SOLUTION INTRAMUSCULAR at 08:09

## 2018-11-27 RX ADMIN — OXYCODONE HYDROCHLORIDE PRN MG: 15 TABLET ORAL at 13:39

## 2018-11-27 RX ADMIN — HYDROMORPHONE HYDROCHLORIDE PRN MG: 4 TABLET ORAL at 11:53

## 2018-11-27 RX ADMIN — METHOCARBAMOL SCH MG: 750 TABLET ORAL at 16:47

## 2018-11-27 RX ADMIN — PANTOPRAZOLE SODIUM SCH MG: 40 TABLET, DELAYED RELEASE ORAL at 07:57

## 2018-11-27 RX ADMIN — HEPARIN SODIUM SCH UNIT: 5000 INJECTION, SOLUTION INTRAVENOUS; SUBCUTANEOUS at 14:04

## 2018-11-27 RX ADMIN — FAMOTIDINE SCH MG: 20 TABLET, FILM COATED ORAL at 07:57

## 2018-11-27 RX ADMIN — HYDROMORPHONE HCL-SODIUM CHLORIDE 0.9% INJ 6 MG/30ML PRN MG: 0.2 SOLUTION at 13:40

## 2018-11-27 RX ADMIN — NORETHINDRONE ACETATE SCH MG: 5 TABLET ORAL at 21:04

## 2018-11-27 RX ADMIN — HYDROMORPHONE HYDROCHLORIDE PRN MG: 4 TABLET ORAL at 16:47

## 2018-11-27 RX ADMIN — SODIUM CHLORIDE SCH MLS: 900 INJECTION, SOLUTION INTRAVENOUS at 08:15

## 2018-11-27 RX ADMIN — METHOCARBAMOL SCH MG: 750 TABLET ORAL at 21:03

## 2018-11-27 RX ADMIN — VENLAFAXINE HYDROCHLORIDE SCH MG: 150 CAPSULE, EXTENDED RELEASE ORAL at 07:57

## 2018-11-27 RX ADMIN — HYDROMORPHONE HYDROCHLORIDE PRN MG: 4 TABLET ORAL at 07:52

## 2018-11-27 RX ADMIN — METHOCARBAMOL SCH MG: 750 TABLET ORAL at 07:53

## 2018-11-27 RX ADMIN — DOCUSATE SODIUM AND SENNOSIDES SCH TAB: 50; 8.6 TABLET ORAL at 07:56

## 2018-11-27 RX ADMIN — POLYETHYLENE GLYCOL 3350 PRN GM: 17 POWDER, FOR SOLUTION ORAL at 07:59

## 2018-11-27 RX ADMIN — DOCUSATE SODIUM AND SENNOSIDES SCH TAB: 50; 8.6 TABLET ORAL at 21:03

## 2018-11-27 RX ADMIN — HEPARIN SODIUM SCH UNIT: 5000 INJECTION, SOLUTION INTRAVENOUS; SUBCUTANEOUS at 21:04

## 2018-11-27 RX ADMIN — GABAPENTIN SCH MG: 300 CAPSULE ORAL at 07:52

## 2018-11-27 RX ADMIN — GABAPENTIN SCH MG: 300 CAPSULE ORAL at 21:03

## 2018-11-27 RX ADMIN — Medication SCH MLS: at 08:00

## 2018-11-27 RX ADMIN — GABAPENTIN SCH MG: 300 CAPSULE ORAL at 16:47

## 2018-11-27 RX ADMIN — ESTRADIOL SCH MG: 0.05 PATCH, EXTENDED RELEASE TRANSDERMAL at 07:59

## 2018-11-27 RX ADMIN — MENTHOL PRN PATCH: 1 SPRAY TOPICAL at 08:21

## 2018-11-27 RX ADMIN — SCOPALAMINE SCH: 1 PATCH, EXTENDED RELEASE TRANSDERMAL at 05:35

## 2018-11-27 RX ADMIN — Medication SCH UNITS: at 07:58

## 2018-11-27 NOTE — SOAPPROG
SOAP Progress Note


Assessment/Plan: 


Assessment:


POD #1 s/p C6/7 ACDF and C3-7 posterior cervial fusion.


Doing well with expected post op interscapular pain


Right arm/hand paresthesias improved























Plan:


Continue C Collar


Continue TAHIRA drain


PT/OT/ST


Activity with assist


Xrays today


11/27/18 08:07





Subjective: 





sitting up in bed, feeling OK. Has expected pain posteriorly. She thinks her 

right arm/hand paresthesias are improved.





Objective: 





 Vital Signs











Temp Pulse Resp BP Pulse Ox


 


 36.5 C   80   16   133/85 H  99 


 


 11/27/18 07:46  11/27/18 07:46  11/27/18 07:46  11/27/18 07:46  11/27/18 07:46








 Laboratory Results





 11/27/18 04:37 





 11/27/18 04:37 





 











 11/26/18 11/27/18 11/28/18





 05:59 05:59 05:59


 


Intake Total 1200 4130 


 


Output Total  1660 


 


Balance 1200 2470 








 











PT  13.5 SEC (12.0-15.0)   11/25/18  10:28    


 


INR  1.01  (0.83-1.16)   11/25/18  10:28    








Neuro:


DURAN, Sens LT


A+Ox 4





Dressing CDI





TAHIRA: 160ml





ICD10 Worksheet


Patient Problems: 


 Problems











Problem Status Onset


 


Neck pain Acute  


 


Arthrodesis status Acute  


 


Cervical radiculitis Acute  


 


Cervical stenosis of spine Acute  


 


H/O cervical spinal arthrodesis Acute  


 


Low back pain Acute  


 


Lumbosacral stenosis Acute

## 2018-11-27 NOTE — ASMTCMCOM
CM Note

 

CM Note                       

Notes:

Patient had surgery today, 11/27. Attempted to meet with patient to discuss dispo options - patient 


down getting X-ray.  She is doing quite well with therapy and likely will be able to d/c home 

independently.  CM will follow-up with patient when able to confirm.



Plan:  TBD 

 

Date Signed:  11/27/2018 04:06 PM

Electronically Signed By:Juany Velasco RN

## 2018-11-27 NOTE — GOP
DATE OF OPERATION:  11/26/2018



SURGEON:  ALLEY Mariano MD



NEUROSURGEON:  Pablo Mariano MD.



ASSISTANT:  Lauren Jiménez, nurse practitioner.



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
usion at C2-3, pseudarthrosis following fusion at C5-6, severe stenosis C6-7, cervical myelopathy.



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 
fusion at C2-3, pseudarthrosis following fusion at C5-6, severe stenosis C6-7, cervical myelopathy.



PROCEDURE PERFORMED:  Posterior cervical laminectomy at C6-7, with decompression spinal canal, C6-7 p
osterior cervical arthrodesis and fusion with instrumentation C3, C4, C5, C6, C7, same incision bone 
graft harvest, spinal stereotaxy.



FINDINGS:  





ESTIMATED BLOOD LOSS:  250 cc.



INDICATIONS:  Please see the accompanying operative report for the anterior surgery that was also don
e same day for detailed account of the patient's presenting symptoms and her prior medical history.  
The risks of the posterior surgery including the discomfort associated with posterior surgery was dis
cussed.  She knew this is a bigger procedure than she had before.  She knew there was a chance of scr
ew and hardware malposition, malfunction, adjacent segment disease, and recurrent pseudoarthrosis janice
n after instrumentation both anterior and posterior.  She wanted to proceed despite the risks procedu
re.



DESCRIPTION OF PROCEDURE:  The patient was already in the operating room for the anterior procedure. 
 At the conclusion of that procedure, she was then placed in the [place] head frame.  Care was taken
 reposition her on the Paul table in the prone position, arms tucked at the side.  Her occiput was
 slightly flexed and the neck was kept neutral.  She was sterilely prepped and draped in usual fashio
n.  Made a midline incision from the spinous process of C2 to the spinous process of T1.  The subcuta
neous tissue was dissected using Bovie cautery down to the fascia and a subperiosteal dissection was 
made down to the inferior C2 lamina.  The C3, C4, C5, C6, C7 laminae were exposed.  We did not expose
 T1, but the spinous process was palpable.  The dissection was deep and now it was somewhat bloodier 
than normal, but this was easily controlled.  Self-retaining retractor was placed.  We attached the S
tealth reference frame to the spinous process of C7.  We decorticated the lateral masses from C3-C7 f
or a posterolateral arthrodesis.  We performed an O-arm spin and using frame of the Stealth stereotax
y, placed pedicle screws bilaterally at C7.  The C7 pedicles were cortical, very hard bone, and we we
re able to successfully navigate the screws into the pedicles of C7.  We then placed lateral mass scr
ews bilaterally at C3, C4, and C5.  The right lateral mass at C6 was really destroyed by the degenera
tive process.  There was not much bone to place a screw into and we chose to forego the right lateral
 mass of C6.  The left lateral mass of C6 was much more substantial and I thought we could get a scre
w in it, albeit a shorter screw, given its transitional segment between the lateral mass trajectories
 and the pedicle screw trajectories at C7.  We placed the screws and performed an O-arm spin, and all
 the screws were carefully observed on the O-arm.  We also observed our intervertebral device at C6-7
.  These screws looked like they were in good position as well.  All the lateral mass screws were in 
excellent position.  We then arranged the tulips for the favored angle of the Medtronic Infinity syst
em and took rods and increased their bend to fit into these tulips.  We distracted somewhat between C
3 and C7 and got further reduction of the C6-7 segment.  There was remarkable spondylosis between the
m.  After the rods were in place, we spent time cleaning up the residual lamina from C3 down to C7.  
We harvested the complete C4, C5 spinous process for autologous grafting purposes.  We harvested the 
C6 spinous process and the entire lamina for autologous grafting purposes.  We harvested the rostral 
C7 lamina for autologous grafting purposes.  The C7, T1 ligaments were preserved.  We opened the liga
mentum flavum at C6-7 after completely removing the C6 lamina.  We decompressed the thecal sac at jose
t level and we even performed bilateral facetectomies and foraminotomies.  We did not remove the face
t joints per se, but we did work our way into the neural foramen for the exiting C7 nerve roots and d
id perform a facetectomy with more work done on the right side than on the left.  There was a large a
mount of spondylotic tissue on the C7 root on the right-hand side.  We got a great decompression.  We
 then decorticated all remaining visible bone, placed our bone autograft and BMP posterolaterally kt
aterally, placed a subfascial drain, and then closed the incision in multiple layers using Vicryl sut
ures.  The skin was reapproximated with interrupted Vicryl suture.  Steri-Strips were applied.  A margot
ssing was applied.  The patient was then flipped back on the hospital bed and then removed from the [facility] head frame.  There were no complications.



COMPLICATIONS:  None.



INSTRUMENTATION USED:  Medtronic Infinity posterior cervical instrumentation.  We used 2 mg of bone m
orphogenic protein and bone autograft only.





Job #:  [id number]

## 2018-11-27 NOTE — GOP
DATE OF OPERATION:  11/26/2018



SURGEON:  ALLEY Mariano MD



ASSISTANT:  Lauren Jiménez NP.



PREOPERATIVE DIAGNOSIS:  Severe stenosis with myelopathy at C6-7, prior cervical fusion C3-C6, pseudo
arthrosis after fusion at C5-6, auto fusion at C2-3.



POSTOPERATIVE DIAGNOSIS:  Severe stenosis with myelopathy at C6-7, prior cervical fusion C3-C6, pseud
oarthrosis after fusion at C5-6, auto fusion at C2-3.



PROCEDURE PERFORMED:  Anterior cervical diskectomy with decompression and arthrodesis at C6-7 with pl
acement of biomechanical intervertebral device at C6-7 without an anterior cervical plate using a Zer
o-P device at C6-7, same incision bone graft harvest, microscope, removal of anterior cervical instru
mentation at C6.



FINDINGS:  





ESTIMATED BLOOD LOSS:  25 cc.



INDICATIONS:  The patient has a prior history of a cervical corpectomy of C4.  There did appear to rosales
ccessfully fuse C3, C4, C5.  She had an ACDF as well at the same time at C5-6 and earlier this year h
ad developed some adjacent segment disease and symptoms predominantly in her left arm and when we saw
 her in February, we discussed with her the need for additional surgery.  It is my feeling that she w
ould need to have surgery, but she wanted to try to delay and I thought that was a reasonable option.
  She at the time was informed of the auto fusion of C2 and C3 and she also was informed of the proba
ble pseudarthrosis at C5-6 that did not appear to be symptomatic.  I suggested an anterior-posterior 
approach to the spine back in February of this year.  Over the Thanksgiving holiday, she presented to
 the hospital with relentless, excruciating, radiating right upper extremity pain and burning to the 
stages that were unbearable.  A lot of this pain improved with some steroids that she received during
 her hospital stay.  An MRI was performed demonstrating severe stenosis at C6-7, that was even worse 
than it was earlier this year and there was evidence of what appeared to be a probable right-sided di
sk herniation at C6-7.  She was offered surgery over the holiday weekend with my partner but was feel
ing better and wanted to put it off.  She did not get total improvement and come Sunday evening, she 
did want to proceed with surgery on Monday with me.  We discussed with her the risks of the surgery i
ncluding the risk of esophageal injury, carotid injury, recurrent laryngeal nerve injury, dysphagia, 
pseudarthrosis, the need for additional spine surgery in the future, continued symptoms, spinal cord 
injury.  She knew there were risks but we thought that we could proceed safely.  She did want to proc
eed.



DESCRIPTION OF PROCEDURE:  The patient was taken to the operating room, placed in the supine position
.  General anesthesia was begun.  A midline shoulder roll was placed.  Care was taken to pad all poin
ts of contact.  Her arms were tucked at her side.  X-rays were taken.  It was difficult to visualize 
the C6-7 level, but we could see the end of the plate at C6.  She was sterilely prepped and draped.  
We made a transverse incision on the left side of the neck.  Her vocal cords were moving normally anjali
or to surgery and so we went contralateral to the prior surgery.  This was discussed with the patient
 prior to the surgery.  We did make left-sided incision using the inferior dominant neck crease.  The
 subcutaneous tissue was dissected using Bovie cautery down through the platysma.  We then used a com
bination of sharp and blunt dissection to work our way medial to the sternocleidomastoid and lateral 
to the strap muscle down to the prevertebral space.  We encountered her prior plate.  We mobilized th
e esophagus off the plate.  There was some scarring.  There was some difficulty mobilizing the esopha
dania, but we were able to do it without injury.  We exposed the bottom portion of the plate.  It was a
 Snowcap plating system.  We removed the locking cap on the left-sided lower screw at C6 and removed 
the screw.  It was slightly loose consistent with a pseudoarthrosis but not remarkably loose.  There 
was subluxation between C6 and C7 and to expose C7, we had to dissect the esophagus off C7 in a somew
hat blind fashion inferiorly below the large lip of bone that was more proximal in our field of view.
  We were able to do this but it took some time.  We placed a distraction pin in C7 and a distraction
 pin in the hole that remained from the screw at C6.  Distracted it at C6-7, partially reducing the s
pondylolisthesis.  We then performed a partial diskectomy of the superficial portions of the disk and
 then introduced the operating microscope.  Under the scope, we removed the disk completely and the c
artilaginous endplate.  We worked our way down the posterior longitudinal ligament where we opened th
e PLL and decompressed the dura and indeed inside the spinal canal was a large free fragment disk on 
the right-hand side, and we were able to remove this completely.  We decompressed into the right-side
d neural foramen at C6-7.  We also performed a left-sided decompression and got a great decompression
.  We then drilled and harvested subchondral bone at C6-7 to place in our implant.  We chose a 9 mm P
[place] intervertebral device, the Zero-P device manufactured by [employer], and we got a great fit.  It w
as their lower profile device.  We inserted it at C6-7 and got good capture of the vertebral bodies w
ith our screws at C6 and C7.  These were locked in place and confirmed with the people in the OR.  We
 then shot an x-ray confirming the position of the device and the position of the screws and we were 
happy with these.  The C6 screw did go right to the posterior edge of the vertebral body but was a gr
case screw.  We then achieved meticulous hemostasis.  One of the dominant thyroid arteries was present
 coming off the carotid and crossed in our field, but there was no bleeding from that.  We did not co
agulate it .  We irrigated with antibiotic saline solution and then closed the incision in multiple l
fried using Vicryl sutures.  Steri-Strips were applied to the skin.  This concluded procedure on #1.



COMPLICATIONS:  None.





Job #:  [id number]

## 2018-11-27 NOTE — HOSPPROG
Hospitalist Progress Note


Assessment/Plan: 





63-year-old female with PMH DJD C-spine with prior C-spine surgery who was 

admitted with right arm radicular symptoms. 





1. Severe cervical stenosis (C6-7) with RUE myelopathy and pain


   - s/p posterior cervical laminectomy at C6-6 and C3-7 fusion


   - s/p course of steroids prior to surgery


   - dilaudid PCA for pain control, decreasing lockout frequency


   - bowel regimen


   - incentive spirometry


   - PT/OT 





2. Left leg weakness: None appreciated on my exam


   - Continue to work with PT/OT





3. Elevated BP: Noted prior to surgery in setting of pain. Now better. Not on 

meds.





4. Peripheral neuropathy: Continue home gabapentin.





5. Depression: Home meds.





VTE prophylaxis: start LMWH


Code Status:  Full code


Dispo: Remain inpatient in post-op setting


Subjective: Right arm pain and numbness are gone. She is having neck pain that 

is relieved by current dilaudid dose but comes back quickly, wondering if can 

decrease PCA lockout. She hasn't had BM since surgery. Breathing ok. Her 

friends noted some left leg weakness.


Objective: 


 Vital Signs











Temp Pulse Resp BP Pulse Ox


 


 36.7 C   84   13   132/66 H  89 L


 


 11/27/18 11:44  11/27/18 14:05  11/27/18 14:05  11/27/18 14:05  11/27/18 14:05








 Laboratory Results





 11/27/18 04:37 





 11/27/18 04:37 





 











 11/26/18 11/27/18 11/28/18





 05:59 05:59 05:59


 


Intake Total 1200 4130 300


 


Output Total  1660 225


 


Balance 1200 2470 75








 











PT  13.5 SEC (12.0-15.0)   11/25/18  10:28    


 


INR  1.01  (0.83-1.16)   11/25/18  10:28    














- Physical Exam


Constitutional: no apparent distress, appears nourished, not in pain


Eyes: PERRL, anicteric sclera, EOMI


Ears, Nose, Mouth, Throat: moist mucous membranes, hearing normal, ears appear 

normal, no oral mucosal ulcers, other (wearing neck brace)


Cardiovascular: regular rate and rhythym, no murmur, rub, or gallop


Respiratory: no respiratory distress, no rales or rhonchi, clear to auscultation


Gastrointestinal: normoactive bowel sounds, soft, non-tender abdomen, no 

palpable masses


Genitourinary: no bladder fullness, no bladder tenderness, no renal bruits


Skin: no rashes or abrasions, no fluctuance, no induration


Musculoskeletal: full muscle strength, no muscle tenderness, normal joint ROM


Neurologic: AAOx3, sensation intact bilaterally


Psychiatric: interacting appropriately, not anxious, not encephalopathic, 

thought process linear





ICD10 Worksheet


Patient Problems: 


 Problems











Problem Status Onset


 


Neck pain Acute  


 


Arthrodesis status Acute  


 


Cervical radiculitis Acute  


 


Cervical stenosis of spine Acute  


 


H/O cervical spinal arthrodesis Acute  


 


Low back pain Acute  


 


Lumbosacral stenosis Acute

## 2018-11-28 RX ADMIN — HYDROMORPHONE HYDROCHLORIDE PRN MG: 4 TABLET ORAL at 11:58

## 2018-11-28 RX ADMIN — METHOCARBAMOL SCH MG: 750 TABLET ORAL at 07:18

## 2018-11-28 RX ADMIN — METHOCARBAMOL SCH MG: 750 TABLET ORAL at 22:18

## 2018-11-28 RX ADMIN — OXYCODONE HYDROCHLORIDE PRN MG: 15 TABLET ORAL at 07:32

## 2018-11-28 RX ADMIN — GABAPENTIN SCH MG: 300 CAPSULE ORAL at 17:10

## 2018-11-28 RX ADMIN — OXYCODONE HYDROCHLORIDE PRN MG: 15 TABLET ORAL at 22:19

## 2018-11-28 RX ADMIN — GABAPENTIN SCH MG: 300 CAPSULE ORAL at 07:20

## 2018-11-28 RX ADMIN — Medication SCH UNITS: at 07:19

## 2018-11-28 RX ADMIN — ENOXAPARIN SODIUM SCH MG: 100 INJECTION SUBCUTANEOUS at 07:20

## 2018-11-28 RX ADMIN — HYDROMORPHONE HYDROCHLORIDE PRN MG: 4 TABLET ORAL at 05:13

## 2018-11-28 RX ADMIN — DOCUSATE SODIUM AND SENNOSIDES SCH TAB: 50; 8.6 TABLET ORAL at 22:19

## 2018-11-28 RX ADMIN — VENLAFAXINE HYDROCHLORIDE SCH MG: 150 CAPSULE, EXTENDED RELEASE ORAL at 07:19

## 2018-11-28 RX ADMIN — POLYETHYLENE GLYCOL 3350 PRN GM: 17 POWDER, FOR SOLUTION ORAL at 07:19

## 2018-11-28 RX ADMIN — DOCUSATE SODIUM AND SENNOSIDES SCH TAB: 50; 8.6 TABLET ORAL at 07:19

## 2018-11-28 RX ADMIN — FAMOTIDINE SCH MG: 20 TABLET, FILM COATED ORAL at 07:18

## 2018-11-28 RX ADMIN — FAMOTIDINE SCH MG: 20 TABLET, FILM COATED ORAL at 22:19

## 2018-11-28 RX ADMIN — GABAPENTIN SCH MG: 300 CAPSULE ORAL at 22:18

## 2018-11-28 RX ADMIN — OXYCODONE HYDROCHLORIDE PRN MG: 15 TABLET ORAL at 14:13

## 2018-11-28 RX ADMIN — NORETHINDRONE ACETATE SCH MG: 5 TABLET ORAL at 22:18

## 2018-11-28 RX ADMIN — HYDROMORPHONE HYDROCHLORIDE PRN MG: 4 TABLET ORAL at 17:11

## 2018-11-28 RX ADMIN — Medication SCH MG: at 22:18

## 2018-11-28 RX ADMIN — METHOCARBAMOL SCH MG: 750 TABLET ORAL at 17:10

## 2018-11-28 RX ADMIN — MENTHOL PRN PATCH: 1 SPRAY TOPICAL at 07:20

## 2018-11-28 RX ADMIN — MAGNESIUM HYDROXIDE PRN ML: 400 SUSPENSION ORAL at 17:22

## 2018-11-28 RX ADMIN — HYDROMORPHONE HCL-SODIUM CHLORIDE 0.9% INJ 6 MG/30ML PRN MG: 0.2 SOLUTION at 07:29

## 2018-11-28 RX ADMIN — PANTOPRAZOLE SODIUM SCH MG: 40 TABLET, DELAYED RELEASE ORAL at 07:21

## 2018-11-28 NOTE — ASMTCMCOM
CM Note

 

CM Note                       

Notes:

Today PT rec inpatient rehab, Hospitalist Davet input the consult order. CM to follow pt 

progress.

 

Date Signed:  11/28/2018 11:23 AM

Electronically Signed By:RACHEL Ruiz

## 2018-11-28 NOTE — NEUSURGPN
Date of Surgery: 11/26/18


Post Op Day: 2


Assessment/Plan: 


Assessment: POD #2 s/p C6/7 ACDF and C3-7 posterior cervial fusion





Plan:


-doing well with expected post op interscapular pain


-continue with current pain management


-pt with some expected swallowing issues, she is eating soft foods at this time-

will continue with ST/watch this


-right arm/hand paresthesias improved


-post op xrays look good


-pt seen and evaluated by Dr Mariano as well


-continue C Collar


-will remove TAHIRA drain today


-PT/OT/ST-CPM


-activity with assist


-call with any questions or concerns


-take medications as directed





Subjective: 


Awake and alert.  NAD.  Eating/drinking and voiding. No f/c/n/v/d.  


Objective: 


AAO x 3, PERRLA/EOMI


no droop CN 2-12 grossly intact


+lt touch


5/5 BUE/BLE =


dresssing CDI


TAHIRA to be removed today


Neuro Check Frequency: per routine


Urinary Catheter in Place: No





- Physician


Discussed Patient with : García


Patient Seen by : García





Neurosurgery Physical Exam





- Vitals, I&O, Labs





 I and O











 11/27/18 11/28/18 11/29/18





 05:59 05:59 05:59


 


Intake Total 4130 1850 


 


Output Total 1660 1175 80


 


Balance 2470 675 -80


 


Weight 83.915 kg  


 


Intake:   


 


  Oral (ml) 350 1450 


 


  IV Intake (ml) 3000 300 


 


  IV Infused (ml) 780 100 


 


    Ns 1,000 ml @ 100 mls/hr 680  





    IV CONT SURYA Rx#:   





    [id number]   


 


    ceFAZolin 2 GM/DEXTROSE 100 100 





    100 ml @ 200 mls/hr IV   





    Q8HRS SURYA Rx#:[id number]   


 


Output:   


 


  Urine (ml) 1275 1075 


 


    Catheter 775 175 


 


    Toilet 500 900 


 


  Estimated Blood Loss (ml) 225  


 


  TAHIRA Drain Output (ml) 160 100 80


 


    #1 Posterior Neck 160 100 80


 


Other:   


 


  Output Comment   


 


    Catheter  Brown catheter discontinued. 


 


  Number of Voids   


 


    Catheter  1 


 


    Toilet  2 


 


  Post Void Residual Scan   





  Volume (ml)   


 


    Catheter  570 


 


    Toilet  570 








 Vital Signs











Temp Pulse Resp BP Pulse Ox


 


 36.8 C   95   16   140/77 H  90 L


 


 11/28/18 07:14  11/28/18 07:14  11/28/18 07:14  11/28/18 07:14  11/28/18 07:14








 Laboratory Results





 11/27/18 04:37 





 11/27/18 04:37 











ICD10 Worksheet


Patient Problems: 


 Problems











Problem Status Onset


 


Neck pain Acute  


 


Arthrodesis status Acute  


 


Cervical radiculitis Acute  


 


Cervical stenosis of spine Acute  


 


H/O cervical spinal arthrodesis Acute  


 


Low back pain Acute  


 


Lumbosacral stenosis Acute

## 2018-11-28 NOTE — HOSPPROG
Hospitalist Progress Note


Assessment/Plan: 





63-year-old female with PMH DJD C-spine with prior C-spine surgery who was 

admitted with right arm radicular symptoms. 





1. Severe cervical stenosis (C6-7) with RUE myelopathy and pain


   - s/p posterior cervical laminectomy at C6-6 and C3-7 fusion on 11/26 with 

Dr Mariano


   - s/p course of steroids prior to surgery


   - TAHIRA drain now removed


   - continue dilaudid PCA, plan to switch to oral regimen tomorrow, discussed 

with patient


   - bowel regimen


   - incentive spirometry


   - PT/OT 





2. Left ankle weakness: Mild with plantarflexion


   - Continue to work with PT/OT


   - Consider L-spine imaging once acute issues resolved





3. Mild dysphagia: Somewhat expected after above surgery   


   - Speech therapy





4. Elevated BP: Noted prior to surgery in setting of pain. Now better. Not on 

meds.





5. Peripheral neuropathy: Continue home gabapentin.





6. Depression: Home meds.





VTE prophylaxis: LMWH


Code Status:  Full code


Dispo: Remain inpatient for pain control, continue working with therapies post-

op. Current plan is for home in 1-2 days.


Subjective: Pain better overall. Some trouble swallowing. Breathing ok. No 

fevers. Drain removed.


Objective: 


 Vital Signs











Temp Pulse Resp BP Pulse Ox


 


 36.8 C   95   16   140/77 H  90 L


 


 11/28/18 07:14  11/28/18 07:14  11/28/18 07:14  11/28/18 07:14  11/28/18 07:14








 Laboratory Results





 11/27/18 04:37 





 11/27/18 04:37 





 











 11/27/18 11/28/18 11/29/18





 05:59 05:59 05:59


 


Intake Total 4130 1850 


 


Output Total 1660 1175 80


 


Balance 2470 675 -80








 











PT  13.5 SEC (12.0-15.0)   11/25/18  10:28    


 


INR  1.01  (0.83-1.16)   11/25/18  10:28    














- Physical Exam


Constitutional: no apparent distress, appears nourished, not in pain


Eyes: PERRL, anicteric sclera, EOMI


Ears, Nose, Mouth, Throat: moist mucous membranes, hearing normal, ears appear 

normal, no oral mucosal ulcers, other (wearing neck brace)


Cardiovascular: no murmur, rub, or gallop, tachycardia, No JVD, No edema


Respiratory: no respiratory distress, no rales or rhonchi, clear to auscultation


Gastrointestinal: normoactive bowel sounds, soft, non-tender abdomen, no 

palpable masses


Genitourinary: no bladder fullness, no bladder tenderness, no renal bruits


Skin: no rashes or abrasions, no fluctuance, no induration


Musculoskeletal: full muscle strength, no muscle tenderness, normal joint ROM


Neurologic: AAOx3, sensation intact bilaterally, other (mild weakness with left 

ankle plantarflexion)


Psychiatric: interacting appropriately, not anxious, not encephalopathic, 

thought process linear





ICD10 Worksheet


Patient Problems: 


 Problems











Problem Status Onset


 


Neck pain Acute  


 


Arthrodesis status Acute  


 


Cervical radiculitis Acute  


 


Cervical stenosis of spine Acute  


 


H/O cervical spinal arthrodesis Acute  


 


Low back pain Acute  


 


Lumbosacral stenosis Acute

## 2018-11-29 RX ADMIN — GABAPENTIN SCH MG: 300 CAPSULE ORAL at 08:13

## 2018-11-29 RX ADMIN — DOCUSATE SODIUM AND SENNOSIDES SCH TAB: 50; 8.6 TABLET ORAL at 22:40

## 2018-11-29 RX ADMIN — VENLAFAXINE HYDROCHLORIDE SCH MG: 150 CAPSULE, EXTENDED RELEASE ORAL at 08:13

## 2018-11-29 RX ADMIN — Medication SCH UNITS: at 08:13

## 2018-11-29 RX ADMIN — HYDROMORPHONE HYDROCHLORIDE PRN MG: 4 TABLET ORAL at 14:28

## 2018-11-29 RX ADMIN — MENTHOL PRN PATCH: 1 SPRAY TOPICAL at 08:00

## 2018-11-29 RX ADMIN — HYDROMORPHONE HYDROCHLORIDE PRN MG: 4 TABLET ORAL at 10:02

## 2018-11-29 RX ADMIN — SCOPALAMINE SCH: 1 PATCH, EXTENDED RELEASE TRANSDERMAL at 14:38

## 2018-11-29 RX ADMIN — GABAPENTIN SCH MG: 400 CAPSULE ORAL at 22:39

## 2018-11-29 RX ADMIN — Medication SCH MG: at 22:41

## 2018-11-29 RX ADMIN — FAMOTIDINE SCH MG: 20 TABLET, FILM COATED ORAL at 08:13

## 2018-11-29 RX ADMIN — POLYETHYLENE GLYCOL 3350 PRN GM: 17 POWDER, FOR SOLUTION ORAL at 08:12

## 2018-11-29 RX ADMIN — ACETAMINOPHEN SCH MG: 500 TABLET ORAL at 10:02

## 2018-11-29 RX ADMIN — METHOCARBAMOL SCH MG: 750 TABLET ORAL at 08:01

## 2018-11-29 RX ADMIN — OXYCODONE HYDROCHLORIDE PRN MG: 15 TABLET ORAL at 08:02

## 2018-11-29 RX ADMIN — OXYCODONE HYDROCHLORIDE PRN MG: 15 TABLET ORAL at 18:18

## 2018-11-29 RX ADMIN — ACETAMINOPHEN SCH MG: 500 TABLET ORAL at 14:26

## 2018-11-29 RX ADMIN — ENOXAPARIN SODIUM SCH MG: 100 INJECTION SUBCUTANEOUS at 08:11

## 2018-11-29 RX ADMIN — PANTOPRAZOLE SODIUM SCH MG: 40 TABLET, DELAYED RELEASE ORAL at 08:13

## 2018-11-29 RX ADMIN — DOCUSATE SODIUM AND SENNOSIDES SCH TAB: 50; 8.6 TABLET ORAL at 08:12

## 2018-11-29 RX ADMIN — ACETAMINOPHEN SCH MG: 500 TABLET ORAL at 22:40

## 2018-11-29 RX ADMIN — FAMOTIDINE SCH MG: 20 TABLET, FILM COATED ORAL at 22:41

## 2018-11-29 RX ADMIN — GABAPENTIN SCH MG: 400 CAPSULE ORAL at 14:29

## 2018-11-29 RX ADMIN — METHOCARBAMOL SCH MG: 750 TABLET ORAL at 14:26

## 2018-11-29 RX ADMIN — HYDROMORPHONE HYDROCHLORIDE PRN MG: 4 TABLET ORAL at 22:13

## 2018-11-29 RX ADMIN — HYDROMORPHONE HYDROCHLORIDE PRN MG: 4 TABLET ORAL at 06:05

## 2018-11-29 RX ADMIN — METHOCARBAMOL SCH MG: 750 TABLET ORAL at 22:40

## 2018-11-29 RX ADMIN — NORETHINDRONE ACETATE SCH MG: 5 TABLET ORAL at 22:41

## 2018-11-29 NOTE — HOSPPROG
Hospitalist Progress Note


Assessment/Plan: 





The patient is a 63-year-old female with PMH DJD C-spine, C-spine surgery who 

was admitted C spine DJD, underwent C spine surgery.





ASSESSMENT/PLAN:





Severe C spine DJD with radiculopathy - s/p C6/7 ACDF and C3-7 posterior 

cervical fusion POD #3


Pain, improved


RLE weakness/numbness





-prn analgesics. 


-Add prn BP medication. 


-NSG recs appreciated. 


-ISU.


-Activity as tolerated. 


-PT/OT.





VTE prophylaxis:  Lovenox. 


Code Status:  Full code





Status:  Inpatient for greater than 2 midnight stay.


Disposition:  Med surge with transfer to acute rehab once cleared by NSG and 

after she gets placement. 


____





SUBJECTIVE: Today pt feels okay. +RLE weakness. 





OBJECTIVE:





Physical Exam:


General: The patient is a female who is alert and in no acute distress. 


HEENT: normocephalic, extraocular movements intact, conjunctivae clear. Mucous 

membranes moist.


Neck: C spine collar in place. 


Abd: soft and nondistended. 


Musculoskeletal:  Normal muscle tone/bulk.


Neuro: cranial nerves II  XII grossly intact. 


Psych:  Appropriate mood and appropriate affect. 


Skin:  No pallor.  No petechiae.





Labs/Imaging/Other Tests: 





MRI C spine - 


C6-7 herniated disc w/ cervical canal stenosis, severe - possible cord 

compression


C6-7 radiculopathy, severe -bilateral


C6-7 slight increased anterolisthesis


C4-5 severe R forminal stenosis. 


C5-6 severe R forminal stenosis. 


Objective: 


 Vital Signs











Temp Pulse Resp BP Pulse Ox


 


 37.0 C   96   16   122/83 H  97 


 


 11/29/18 11:30  11/29/18 11:30  11/29/18 11:30  11/29/18 11:30  11/29/18 11:30








 Laboratory Results





 11/27/18 04:37 





 11/27/18 04:37 





 











 11/28/18 11/29/18 11/30/18





 05:59 05:59 05:59


 


Intake Total 1850 1700 


 


Output Total 1175 895 600


 


Balance 675 805 -600








 











PT  13.5 SEC (12.0-15.0)   11/25/18  10:28    


 


INR  1.01  (0.83-1.16)   11/25/18  10:28    














ICD10 Worksheet


Patient Problems: 


 Problems











Problem Status Onset


 


Neck pain Acute  


 


Arthrodesis status Acute  


 


Cervical radiculitis Acute  


 


Cervical stenosis of spine Acute  


 


H/O cervical spinal arthrodesis Acute  


 


Low back pain Acute  


 


Lumbosacral stenosis Acute

## 2018-11-29 NOTE — ASMTCMCOM
CM Note

 

CM Note                       

Notes:

11/29/2018 Case Management Note



Phone call from [facility] that inpatient rehab is not available.  [facility]



Discussed discharge options with patient.  Pt agreeable to SNF rehab as well.  Faxed referrals to 

[facility] and all three facilities in [place].  [facility] is first choice.



Case Management d/c poc:  to be determined pending acceptance by facilities.



Case Management to follow.

 

Date Signed:  11/29/2018 12:02 PM

Electronically Signed By:Bella Sotelo RN

## 2018-11-29 NOTE — NEUSURGPN
Date of Surgery: 11/26/18


Post Op Day: 3


Assessment/Plan: 


Assessment: POD #3 s/p C6/7 ACDF and C3-7 posterior cervical fusion





Plan:


-doing well with expected post op interscapular pain that is better today


-LLE weakness is better with 5/5 this am-will continue to watch at this time


-continue with current pain management plan


-pt with some expected swallowing issues, she is eating soft foods at this time

, she ate meatloaf and mashed potato yesterday-will continue with ST and watch 

this at this time


-right arm/hand paresthesias improved


-post op xrays look good


-pt seen and evaluated by Dr Mariano as well this am


-continue C Collar


-TAHIRA drain removed yesterday 


-PT/OT/ST-CPM


-activity with assist


-call with any questions or concerns


-take medications as directed


Subjective: 


Awake and alert.  Pt with expected neck pain and some continued but stable 

swallowing issues.  No ha/cp/sob/abd or gu complaints.  


Objective: 


AAO x 3, PERRLA/EOMI


no droop CN 2-12 grossly intact


+lt touch


5/5 BUE/BLE =


dresssing CDI


neck soft and supple


TAHIRA site looks fine


Neuro Check Frequency: per routine


Urinary Catheter in Place: No





- Physician


Discussed Patient with .: García


Patient Seen by : García





Neurosurgery Physical Exam





- Vitals, I&O, Labs





 I and O











 11/28/18 11/29/18 11/30/18





 05:59 05:59 05:59


 


Intake Total 1850 1700 


 


Output Total 1175 895 600


 


Balance 675 805 -600


 


Intake:   


 


  Oral (ml) 1450 1700 


 


  IV Intake (ml) 300  


 


  IV Infused (ml) 100  


 


    ceFAZolin 2 GM/DEXTROSE 100  





    100 ml @ 200 mls/hr IV   





    Q8HRS SURYA Rx#:[id number]   


 


Output:   


 


  Urine (ml) 1075 800 600


 


    Catheter 175  


 


    Toilet 900 800 600


 


  TAHIRA Drain Output (ml) 100 95 


 


    #1 Posterior Neck 100 95 


 


Other:   


 


  Intake Quantity  Yes 





  Sufficient   


 


  Output Comment   


 


    Catheter Brown catheter discontinued.  


 


  Number of Voids   


 


    Catheter 1  


 


    Toilet 2 2 1


 


  Post Void Residual Scan   





  Volume (ml)   


 


    Catheter 570  


 


    Toilet 570  








 Vital Signs











Temp Pulse Resp BP Pulse Ox


 


 36.7 C   99   16   142/80 H  95 


 


 11/29/18 03:49  11/29/18 03:49  11/29/18 03:49  11/29/18 03:49  11/29/18 03:49








 Laboratory Results





 11/27/18 04:37 





 11/27/18 04:37 











ICD10 Worksheet


Patient Problems: 


 Problems











Problem Status Onset


 


Neck pain Acute  


 


Arthrodesis status Acute  


 


Cervical radiculitis Acute  


 


Cervical stenosis of spine Acute  


 


H/O cervical spinal arthrodesis Acute  


 


Low back pain Acute  


 


Lumbosacral stenosis Acute

## 2018-11-30 VITALS — SYSTOLIC BLOOD PRESSURE: 150 MMHG | DIASTOLIC BLOOD PRESSURE: 80 MMHG

## 2018-11-30 RX ADMIN — GABAPENTIN SCH MG: 400 CAPSULE ORAL at 15:23

## 2018-11-30 RX ADMIN — METHOCARBAMOL SCH MG: 750 TABLET ORAL at 08:49

## 2018-11-30 RX ADMIN — DOCUSATE SODIUM AND SENNOSIDES SCH TAB: 50; 8.6 TABLET ORAL at 08:53

## 2018-11-30 RX ADMIN — GABAPENTIN SCH MG: 400 CAPSULE ORAL at 08:49

## 2018-11-30 RX ADMIN — ENOXAPARIN SODIUM SCH MG: 100 INJECTION SUBCUTANEOUS at 08:54

## 2018-11-30 RX ADMIN — HYDROMORPHONE HYDROCHLORIDE PRN MG: 4 TABLET ORAL at 08:49

## 2018-11-30 RX ADMIN — ACETAMINOPHEN SCH MG: 500 TABLET ORAL at 10:48

## 2018-11-30 RX ADMIN — MAGNESIUM HYDROXIDE PRN ML: 400 SUSPENSION ORAL at 06:30

## 2018-11-30 RX ADMIN — OXYCODONE HYDROCHLORIDE PRN MG: 15 TABLET ORAL at 06:21

## 2018-11-30 RX ADMIN — HYDROMORPHONE HYDROCHLORIDE PRN MG: 4 TABLET ORAL at 02:51

## 2018-11-30 RX ADMIN — FAMOTIDINE SCH MG: 20 TABLET, FILM COATED ORAL at 08:54

## 2018-11-30 RX ADMIN — HYDROMORPHONE HYDROCHLORIDE PRN MG: 4 TABLET ORAL at 15:35

## 2018-11-30 RX ADMIN — PANTOPRAZOLE SODIUM SCH MG: 40 TABLET, DELAYED RELEASE ORAL at 08:50

## 2018-11-30 RX ADMIN — METHOCARBAMOL SCH MG: 750 TABLET ORAL at 15:24

## 2018-11-30 RX ADMIN — ACETAMINOPHEN SCH MG: 500 TABLET ORAL at 15:24

## 2018-11-30 RX ADMIN — Medication SCH UNITS: at 08:52

## 2018-11-30 RX ADMIN — VENLAFAXINE HYDROCHLORIDE SCH MG: 150 CAPSULE, EXTENDED RELEASE ORAL at 08:53

## 2018-11-30 NOTE — PDDCSUM
Discharge Summary


Discharge Summary: 





Date of Admission:  November 23, 2018





Date of Discharge:  November 30, 2018





Discharge Diagnoses: 


Severe C spine DJD with radiculopathy


s/p C6/7 ACDF and C3-7 posterior cervical fusion POD #4


Neck Pain, improved


RLE weakness/numbness, improved


HTN, steroid/pain induced





Admission Diagnoses: 


Severe acute radicular Cervical pain with new disc disease 


Multiple prior spine surgeries including an upper cervical fusion


Hypertension 2/2 pain and anxiety





Consultants:


[facility] - Dr. Hayes Persaud and Dr. Pablo Mariano





[facility] Course: 


Patient is a 63-year-old female who came to the ED with right arm radicular 

pain and neuropathic pain which had been worsening over the prior 3-4 days.  

She had a long history of spine disease with 2 prior lumbar surgeries and a 

fusion of her upper cervical spine.  She had ongoing neck pain and was 

following up with her neurosurgeon, Dr. Pablo Mariano, who told her she would need 

surgery on her lower cervical spine.  Two weeks prior to admission, she tripped 

and fell.  Six days prior to admission, she tripped on an uneven curb and fell.

  Both injuries resulted in increase neck pain. Patient developed paresthesias 

in the right upper extremity.  Patient underwent cervical spine surgery on 

November 26, 2018 without complications.  Following surgery, patient's mobility

, pain, and paresthesias improved.  She was transferred to [facility] skilled 

nursing facility to continue recovery. 








Physical Exam: 


Gen - alert, oriented, in NAD. 


Neck: in rigid C collar


MSK: nl muscle tone/bulk, ambulating. 





Condition: Fair. 





Discharged to: [facility] skilled nursing facility/subacute rehab





Pertinent tests/labs/imaging:


MRI C spine - 


C6-7 herniated disc w/ cervical canal stenosis, severe - possible cord 

compression


C6-7 radiculopathy, severe -bilateral


C6-7 slight increased anterolisthesis


C4-5 severe R forminal stenosis. 


C5-6 severe R forminal stenosis. 





Medications: Please see med rec form. 





Special instructions: 


Return with increasing pain, weakness, numbness or any other concerns.  Take 

your entire course of steroid.


Started amlodipine 5mg daily for HTN secondary to being on steroid and from 

pain. May DC this medication in the future, following recovery. Discuss with 

PCP first.  





Follow up: NSG Dr. Shah in 5-7 days. PCP Dr. Beatriz after DC from SNF/Rehab. 





> 30 minutes of total time was spent on counseling and coordination of care for 

this patient's discharge.

## 2018-11-30 NOTE — ASMTCMCOM
CM Note

 

CM Note                       

Notes:

Today [employer] authorized SNF. Pt chooses [facility]. Pt partner Simona to transport. Orders sent in 

[employer]. AIDA Henson to call report. 

 

Date Signed:  11/30/2018 04:20 PM

Electronically Signed By:RACHEL Ruiz

## 2018-11-30 NOTE — ASMTLACE
LACE

 

Length of stay for            Answers:  7-13 days                             

current admission                                                             

Acuity / Level of             Answers:  Yes                                   

Care: Did the patient                                                         

have an inpatient                                                             

admission?                                                                    

Comorbidities - select        Answers:  Coronary Artery Disease               

all that apply                                                                

                                        Opioid dependence                     

                                        / Chronic pain                        

                                        Other                         Notes:  HTN

# of Emergency department     Answers:  1-2                                   

visits in the last 6                                                          

months                                                                        

Social determinants           Answers:  Mental health diagnosis               

                                        (anxiety, depression, pers            

                                        onality disorders, etc.)              

Score: 19

 

Date Signed:  11/30/2018 03:38 PM

Electronically Signed By:RACHEL Ruiz

## 2018-11-30 NOTE — NEUSURGPN
Assessment/Plan: 





Assessment: POD #4 s/p C6/7 ACDF and C3-7 posterior cervical fusion





Plan:


-LLE weakness improving, right posterior arm pain 


-continue with current pain management plan


-Continue with SLP, states swallowing is improving 


-post op xrays look good


-continue C Collar


-TAHIRA drain removed 


-PT/OT/ST-CPM


-call with any questions or concerns


-Dispo planning, if cleared by PT/SLP maybe able to transfer to SNF later 

today. Will await there evaluations. 





Subjective: 


swallowing improving. Some right posterior arm pain. 


Objective: 


 


AAO x 3,


+lt touch


5/5 BUE/BLE =


Incisions CDI


neck soft and supple





- Physician


Discussed Patient with .: García





Neurosurgery Physical Exam





- Vitals, I&O, Labs





 I and O











 11/29/18 11/30/18 12/01/18





 05:59 05:59 05:59


 


Intake Total 1700 300 


 


Output Total 895 600 


 


Balance 805 -300 


 


Intake:   


 


  Oral (ml) 1700 300 


 


Output:   


 


  Urine (ml) 800 600 


 


    Toilet 800 600 


 


  TAHIRA Drain Output (ml) 95  


 


    #1 Posterior Neck 95  


 


Other:   


 


  Intake Quantity Yes Yes 





  Sufficient   


 


  Number of Voids   


 


    Toilet 2 2 


 


  Number of Stools   


 


    Toilet  0 








 Vital Signs











Temp Pulse Resp BP Pulse Ox


 


 37.1 C   103 H  16   150/80 H  93 


 


 11/30/18 08:00  11/30/18 08:00  11/30/18 08:00  11/30/18 08:00  11/30/18 08:00








 Laboratory Results





 11/27/18 04:37 





 11/27/18 04:37 











ICD10 Worksheet


Patient Problems: 


 Problems











Problem Status Onset


 


Neck pain Acute  


 


Arthrodesis status Acute  


 


Cervical radiculitis Acute  


 


Cervical stenosis of spine Acute  


 


H/O cervical spinal arthrodesis Acute  


 


Low back pain Acute  


 


Lumbosacral stenosis Acute

## 2019-01-03 ENCOUNTER — HOSPITAL ENCOUNTER (OUTPATIENT)
Dept: HOSPITAL 80 - FIMAGING | Age: 64
End: 2019-01-03
Attending: PHYSICIAN ASSISTANT
Payer: COMMERCIAL

## 2019-01-03 DIAGNOSIS — Z98.1: Primary | ICD-10-CM

## 2019-02-08 ENCOUNTER — HOSPITAL ENCOUNTER (EMERGENCY)
Dept: HOSPITAL 80 - FED | Age: 64
Discharge: HOME | End: 2019-02-08
Payer: COMMERCIAL

## 2019-02-08 VITALS — DIASTOLIC BLOOD PRESSURE: 91 MMHG | SYSTOLIC BLOOD PRESSURE: 155 MMHG

## 2019-02-08 DIAGNOSIS — F41.9: ICD-10-CM

## 2019-02-08 DIAGNOSIS — R42: Primary | ICD-10-CM

## 2019-02-08 DIAGNOSIS — E86.9: ICD-10-CM

## 2019-02-08 DIAGNOSIS — I10: ICD-10-CM

## 2019-02-08 LAB — PLATELET # BLD: 282 10^3/UL (ref 150–400)

## 2019-02-08 PROCEDURE — A9585 GADOBUTROL INJECTION: HCPCS

## 2019-02-08 NOTE — EDPHY
H & P


Time Seen by Provider: 02/08/19 07:41


HPI/ROS: 





Chief complaint.  Dizzy





HPI.  63-year-old female presents with dizziness for the past 2 days.  It began 

after doing physical therapy where she was having manipulation of her neck and 

some dried kneeling.  She then turned her head and felt a tightness or pop on 

the left side of her neck.  She had headache.  She has had nystagmus.  PCP 

tried Epley maneuver x2 without relief.  The patient has a sense of movement 

and spinning that is worse with head movement.  Nausea but no vomiting or 

diarrhea.  No chest pain or shortness of breath.  No abdominal pain.  She had a 

posterior cervical fusion November 26th 2018.  Stable C-spine x-ray on 1/3/

2019.  She had significant radiculopathy prior to surgery.  Denies any 

radiculopathy or arm weakness.  No fever.





ROS


10 systems were reviewed and negative with the exception of the elements 

mentioned in the history of present illness





Past Medical/Surgical History: 





Coronary artery disease, anxiety, depression, hypertension, neuropathy, C-spine 

disease, spine fusions


Social History: 





, nonsmoker, no alcohol


Smoking Status: Never smoked


Physical Exam: 





General Appearance:  Alert pleasant well-developed female mild distress vitals 

are stable


Eyes:  Pupils equal round reactive.  No nystagmus.


ENT, Mouth:  Mucous membranes are moist.


Respiratory:  There are no retractions, lungs are clear to auscultation.


Cardiovascular: Regular rate and rhythm.


Gastrointestinal:   Abdomen is soft and nontender, no masses, bowel sounds 

normal.


Neurological:  Awake and alert, sensory and motor exams grossly normal.


Skin: Warm and dry, no rashes.


Musculoskeletal:  Neck it is restrained and soft collar.  Some tenderness to 

the left side of her neck.


Extremities  symmetrical, full range of motion.


Psychiatric: Patient is oriented X 3, there is no agitation.


Constitutional: 


 Initial Vital Signs











Temperature (C)  36.8 C   02/08/19 07:08


 


Heart Rate  102 H  02/08/19 07:08


 


Respiratory Rate  16   02/08/19 07:08


 


Blood Pressure  152/100 H  02/08/19 07:08


 


O2 Sat (%)  97   02/08/19 07:08








 











O2 Delivery Mode               Room Air














Allergies/Adverse Reactions: 


 





nalbuphine HCl [From Nubain] Allergy (Intermediate, Verified 02/08/19 07:10)


 Other-Enter Comments


promethazine HCl [From Phenergan] Allergy (Intermediate, Verified 02/08/19 07:10

)


 Anxiety


cefaclor Allergy (Unknown, Unverified 02/08/19 07:10)


 Hives


nalbuphine HCl Allergy (Unknown, Uncoded 02/08/19 07:10)


 Other-Enter Comments


promethazine HCl Allergy (Unknown, Uncoded 02/08/19 07:10)


 Anxiety








Home Medications: 














 Medication  Instructions  Recorded


 


Fluoxetine HCl [Prozac 40 mg] 40 mg PO DAILY 09/16/15


 


Cyanocobalamin [Vitamin B12 1,000 mcg IM Q30D 04/29/16





1000MCG/ML (*)]  


 


Pantoprazole Sodium [Protonix 40mg 40 mg PO DAILY 02/10/17





(*)]  


 


Venlafaxine Xr [Effexor Xr] 150 mg PO DAILY 07/04/18


 


Acetaminophen/ASA/Caffeine 1 each PO DAILY PRN 11/23/18





[Excedrin Tablet (*)]  


 


Cholecalciferol Vit D3 [Vitamin D3 2,000 units PO DAILY 11/23/18





(*)]  


 


Estradiol 0.05 mg TD TUFR 11/23/18


 


Lidocaine [Lidoderm] 1 each TP DAILY PRN 11/23/18


 


Norethindrone Acetate 2.5 mg PO HS 11/24/18


 


Acetaminophen [Tylenol  mg 1,000 mg PO TID  tab 11/30/18





(*)]  


 


Enoxaparin [Lovenox 40 MG (*)] 40 mg SC DAILY  syr 11/30/18


 


Gabapentin [Neurontin 400 MG (*)] 800 mg PO TID  cap 11/30/18


 


HYDROmorphone HCL [Dilaudid 4 mg 4 mg PO Q4HRS PRN  tab 11/30/18





(*)]  


 


Melatonin [Melatonin 3 MG (*)] 3 mg PO HS  tab 11/30/18


 


Methocarbamol [Robaxin 750 mg (*)] 1,500 mg PO TID  tab 11/30/18


 


Ondansetron  HCl Pf [Zofran 4 mg 4 mg IVP Q4 PRN  vial 11/30/18





Inj (*)]  


 


Ondansetron Odt [Zofran Odt 4 mg 4 - 8 mg PO Q6HRS PRN  tab 11/30/18





(*)]  


 


Patch Removal 1 ea TD [id number]  patch 11/30/18


 


Patch Removal 1 ea TD Q72H  patch 11/30/18


 


Polyethylene Glycol 3350 [Miralax 17 gm PO DAILY PRN  pkt 11/30/18





17 gm (*)]  


 


Sennosides/Docusate Sodium 1 - 2 tab PO BID  tab 11/30/18





[Senokot-S]  


 


amLODIPine BESYLATE [Norvasc 5 mg 5 mg PO DAILY  tab 11/30/18





(*)]  


 


oxyCODONE IR [Oxycodone Ir (*)] 5 - 10 mg PO QID PRN #0 11/30/18


 


Meclizine HCl 25 mg PO Q6-8PRN PRN #14 tablet 02/08/19














Medical Decision Making





- Diagnostics


EKG Interpretation: 





EKG interpreted by me shows normal sinus rhythm.  First-degree AV block.  Left 

axis deviation.  No significant ST elevation or depression.  No arrhythmia.  

The rate is 87.





No significant change from previous EKG April 2016 


Imaging Results: 


 Imaging Impressions





Brain MRI  02/08/19 07:59


Impression:


1. Mild atrophy.


2. No acute hemorrhage, definite acute infarct, hydrocephalus or mass effect.


3. Several nonspecific hyperintense T2/FLAIR signal abnormalities in the white 

matter of bilateral cerebral hemispheres. Differential diagnosis includes 

moderate microvascular ischemic gliosis, versus post-infectious/post-

inflammatory sequela.


4. Mild right maxillary sinusitis.


5. No enhancing lesions.


 


Findings and recommendations discussed with Emergency Department physician, 

Mike Morton at 1424 hour, 2/8/2019.


 


Final report concurs with initial preliminary interpretation.








Neck MRA  02/08/19 07:59


Impression: MRA of the cervical carotids and vertebrals demonstrate no evidence 

of flow-limiting stenosis, occlusion, or dissection.


 


Measurements of carotid stenosis is based on the residual internal carotid 

diameter with [employer] Symptomatic Carotid Endarterectomy Trial (NASCET) 

based stenosis levels.


 


Findings and recommendations discussed with Emergency Department physician, 

Mike Morton at 1409 hour, 2/8/2019.


 


Final report concurs with initial preliminary interpretation.








Cervical Spine X-Ray  02/08/19 08:00


Impression: Stable cervical spine with fusion from C3 through C7 as above, with 

no acute findings. If there is persistent pain or neurologic deficit, consider 

CT or MRI.








C-spine fusion is normal on plain x-ray





Brain MRI, neck MRA the reviewed by me and discussed with  are nonacute


Procedures: 





IV normal saline.  Meclizine orally


ED Course/Re-evaluation: 





Re-evaluation at 2:40 p.m. Patient is stable.  Patient and I discussed imaging 

and lab results. we discussed treatment plan including criteria for return and 

importance of follow-up and further evaluation.  She expresses understanding 

and agreement


Differential Diagnosis: 





I considered acute coronary syndrome, cardiac as well as neurologic causes of 

her dizziness.  No evidence for hardware disruption in the cervical spine.





- Data Points


Laboratory Results: 


 Laboratory Results





 02/08/19 07:15 





 02/08/19 07:15 





 











  02/08/19 02/08/19





  07:15 07:15


 


WBC    6.59 10^3/uL 10^3/uL





    (3.80-9.50) 


 


RBC    4.59 10^6/uL 10^6/uL





    (4.18-5.33) 


 


Hgb    14.6 g/dL g/dL





    (12.6-16.3) 


 


Hct    44.9 % %





    (38.0-47.0) 


 


MCV    97.8 fL fL





    (81.5-99.8) 


 


MCH    31.8 pg pg





    (27.9-34.1) 


 


MCHC    32.5 g/dL g/dL





    (32.4-36.7) 


 


RDW    12.4 % %





    (11.5-15.2) 


 


Plt Count    282 10^3/uL 10^3/uL





    (150-400) 


 


MPV    9.6 fL fL





    (8.7-11.7) 


 


Neut % (Auto)    68.0 % %





    (39.3-74.2) 


 


Lymph % (Auto)    22.9 % %





    (15.0-45.0) 


 


Mono % (Auto)    6.4 % %





    (4.5-13.0) 


 


Eos % (Auto)    1.5 % %





    (0.6-7.6) 


 


Baso % (Auto)    0.6 % %





    (0.3-1.7) 


 


Nucleat RBC Rel Count    0.0 % %





    (0.0-0.2) 


 


Absolute Neuts (auto)    4.48 10^3/uL 10^3/uL





    (1.70-6.50) 


 


Absolute Lymphs (auto)    1.51 10^3/uL 10^3/uL





    (1.00-3.00) 


 


Absolute Monos (auto)    0.42 10^3/uL 10^3/uL





    (0.30-0.80) 


 


Absolute Eos (auto)    0.10 10^3/uL 10^3/uL





    (0.03-0.40) 


 


Absolute Basos (auto)    0.04 10^3/uL 10^3/uL





    (0.02-0.10) 


 


Absolute Nucleated RBC    0.00 10^3/uL 10^3/uL





    (0-0.01) 


 


Immature Gran %    0.6 % %





    (0.0-1.1) 


 


Immature Gran #    0.04 10^3/uL 10^3/uL





    (0.00-0.10) 


 


Sodium  139 mEq/L mEq/L  





   (135-145)  


 


Potassium  4.6 mEq/L mEq/L  





   (3.5-5.2)  


 


Chloride  107 mEq/L mEq/L  





   ()  


 


Carbon Dioxide  26 mEq/l mEq/l  





   (22-31)  


 


Anion Gap  6 mEq/L mEq/L  





   (6-14)  


 


BUN  15 mg/dL mg/dL  





   (7-23)  


 


Creatinine  0.7 mg/dL mg/dL  





   (0.6-1.0)  


 


Estimated GFR  > 60   





   


 


Glucose  119 mg/dL H mg/dL  





   ()  


 


Calcium  9.0 mg/dL mg/dL  





   (8.5-10.4)  











Medications Given: 


 








Discontinued Medications





Sodium Chloride (Ns)  1,000 mls @ 0 mls/hr IV EDNOW ONE; Wide Open


   PRN Reason: Protocol


   Stop: 02/08/19 07:59


   Last Admin: 02/08/19 08:05 Dose:  1,000 mls


Lorazepam (Ativan Injection)  1 mg IVP EDNOW ONE


   Stop: 02/08/19 10:56


   Last Admin: 02/08/19 10:58 Dose:  1 mg


Meclizine HCl (Meclizine Hcl)  25 mg PO EDNOW ONE


   Stop: 02/08/19 08:00


   Last Admin: 02/08/19 08:05 Dose:  25 mg


Meclizine HCl (Meclizine Hcl)  25 mg PO EDNOW ONE


   Stop: 02/08/19 14:14


   Last Admin: 02/08/19 14:15 Dose:  25 mg








Departure





- Departure


Disposition: Home, Routine, Self-Care


Clinical Impression: 


 Vertigo





Condition: Good


Instructions:  Vertigo (ED)


Additional Instructions: 


Drink plenty of fluids stay hydrated.





Meclizine 1 pill every 6-8 hours as needed for dizziness





Return for worsening symptoms.





Re-evaluation by Dr. Henderson in 2-3 days if not improved


Referrals: 


Verna Henderson MD [Primary Care Provider] - 2-3 days, if not improved


Prescriptions: 


Meclizine HCl 25 mg PO Q6-8PRN PRN #14 tablet


 PRN Reason: Dizziness

## 2019-02-08 NOTE — CPEKG
Test Reason : OPEN

Blood Pressure : ***/*** mmHG

Vent. Rate : 087 BPM     Atrial Rate : 087 BPM

   P-R Int : 220 ms          QRS Dur : 091 ms

    QT Int : 353 ms       P-R-T Axes : 002 -34 012 degrees

   QTc Int : 425 ms

 

Sinus rhythm

Prolonged NV interval

Inferior infarct, old

Anterior infarct, old

Lateral leads are also involved

 

Confirmed by Mike Morton ([id number]) on 2/8/2019 7:46:09 AM

 

Referred By: PHYSICIAN ED           Confirmed By:Mike Morton

## 2019-04-26 ENCOUNTER — HOSPITAL ENCOUNTER (OUTPATIENT)
Dept: HOSPITAL 80 - FIMAGING | Age: 64
End: 2019-04-26
Attending: PHYSICIAN ASSISTANT
Payer: COMMERCIAL

## 2019-04-26 DIAGNOSIS — Z98.1: Primary | ICD-10-CM

## 2019-04-30 ENCOUNTER — HOSPITAL ENCOUNTER (OUTPATIENT)
Dept: HOSPITAL 80 - EMCIMAGING | Age: 64
End: 2019-04-30
Attending: NURSE PRACTITIONER
Payer: COMMERCIAL

## 2019-04-30 DIAGNOSIS — M16.12: Primary | ICD-10-CM

## 2025-04-09 NOTE — HOSPPROG
Done.    Hospitalist Progress Note


Assessment/Plan: 





The patient is a 7-year-old female with PMH DJD C-spine, C-spine surgery who 

was admitted C spine DJD.





This patient is new to me.  Reviewed patient's chart/records for this visit.





ASSESSMENT/PLAN:





Severe C spine DJD with radiculopathy


   -possible cord compression 2/2 disc bulge (C6-7)


   -severe neural forminal radiculopathy, multilevel, R > L


   -anterolisthesis C6-7


Severe pain, 2/2 above


RUE weakness/numbness





-Continue steroid to help w/ cord swelling/pain.


-NSG recs appreciated - considering surgery early next week. Dr. Persaud offered 

to do surgery but pt wants Dr. Mariano to do it bc he has done surgery on her in 

the past.  


-prn analgesics.


-Continue home meds - restart norethindrone. 


-ISU.


-Activity as tolerated. 


-PT/OT after surgery.








VTE prophylaxis:  Heparin


Code Status:  Full code





Status:  Inpatient for greater than 2 midnight stay.


Disposition:  Med surge with discharge sometime after her surgery next week


____





SUBJECTIVE: Today pt feels okay, pain controlled currently. Awaiting surgery.





OBJECTIVE:





Physical Exam:


General: The patient is a female who is alert and in no acute distress. 


HEENT: normocephalic, extraocular movements intact, conjunctivae clear. Mucous 

membranes moist.


Neck: trachea midline, no visible masses. 


Abd: soft and nondistended. 


Musculoskeletal:  Normal muscle tone/bulk.


Neuro: cranial nerves II  XII grossly intact. 


Psych:  Appropriate mood and appropriate affect. 


Skin:  No pallor.  No petechiae.


Heme/lymph:  No peripheral edema at bilateral lower extremities.





Labs/Imaging/Other Tests: 





MRI C spine - 


C6-7 herniated disc w/ cervical canal stenosis, severe - possible cord 

compression


C6-7 radiculopathy, severe -bilateral


C6-7 slight increased anterolisthesis


C4-5 severe R forminal stenosis. 


C5-6 severe R forminal stenosis. 


Objective: 


 Vital Signs











Temp Pulse Resp BP Pulse Ox


 


 36.9 C   87   18   160/84 H  92 


 


 11/24/18 15:38  11/24/18 15:38  11/24/18 15:38  11/24/18 15:38  11/24/18 15:38








 











 11/23/18 11/24/18 11/25/18





 05:59 05:59 05:59


 


Intake Total  2500 


 


Output Total  802 


 


Balance  1698 














ICD10 Worksheet


Patient Problems: 


 Problems











Problem Status Onset


 


Neck pain Acute  


 


Arthrodesis status Acute  


 


Cervical radiculitis Acute  


 


Cervical stenosis of spine Acute  


 


H/O cervical spinal arthrodesis Acute  


 


Low back pain Acute  


 


Lumbosacral stenosis Acute